# Patient Record
Sex: FEMALE | Race: WHITE | URBAN - METROPOLITAN AREA
[De-identification: names, ages, dates, MRNs, and addresses within clinical notes are randomized per-mention and may not be internally consistent; named-entity substitution may affect disease eponyms.]

---

## 2022-04-10 ENCOUNTER — HISTORICAL (OUTPATIENT)
Dept: ADMINISTRATIVE | Facility: HOSPITAL | Age: 22
End: 2022-04-10

## 2022-04-26 VITALS
SYSTOLIC BLOOD PRESSURE: 110 MMHG | WEIGHT: 211 LBS | HEIGHT: 70 IN | BODY MASS INDEX: 30.21 KG/M2 | DIASTOLIC BLOOD PRESSURE: 78 MMHG

## 2022-06-03 ENCOUNTER — TELEPHONE (OUTPATIENT)
Dept: PLASTIC SURGERY | Facility: CLINIC | Age: 22
End: 2022-06-03
Payer: COMMERCIAL

## 2022-06-03 DIAGNOSIS — F64.0 GENDER DYSPHORIA IN ADOLESCENT AND ADULT: Primary | ICD-10-CM

## 2022-06-03 NOTE — TELEPHONE ENCOUNTER
[facility] :  Care Coordination Note     SITUATION   Benito Nur (they/them) is a 21 year old adult who is receiving support for:  Care Team  .    BACKGROUND     Writer scheduled consultations with Ayesha to discuss top surgery.     ASSESSMENT     Surgery              CGC Assessment  Comprehensive Gender Care (CGC) Enrollment: Enrolled  Patient has a therapist: Yes  Letter of support #1: Requested  Surgery being considered: Yes  Mastectomy: Yes          PLAN          Nursing Interventions:      Follow-up plan:    1. Obtain JANNET Pineda

## 2022-06-15 ENCOUNTER — TRANSFERRED RECORDS (OUTPATIENT)
Dept: MULTI SPECIALTY CLINIC | Facility: CLINIC | Age: 22
End: 2022-06-15

## 2022-06-15 LAB — PAP-ABSTRACT: NORMAL

## 2022-06-24 ENCOUNTER — OFFICE VISIT (OUTPATIENT)
Dept: PLASTIC SURGERY | Facility: AMBULATORY SURGERY CENTER | Age: 22
End: 2022-06-24
Payer: COMMERCIAL

## 2022-06-24 VITALS — WEIGHT: 222 LBS | DIASTOLIC BLOOD PRESSURE: 72 MMHG | SYSTOLIC BLOOD PRESSURE: 118 MMHG

## 2022-06-24 DIAGNOSIS — Z80.3 FAMILY HISTORY OF MALIGNANT NEOPLASM OF BREAST: ICD-10-CM

## 2022-06-24 DIAGNOSIS — F64.0 GENDER DYSPHORIA IN ADOLESCENT AND ADULT: Primary | ICD-10-CM

## 2022-06-24 PROCEDURE — 99203 OFFICE O/P NEW LOW 30 MIN: CPT | Performed by: PLASTIC SURGERY

## 2022-06-24 RX ORDER — CLONIDINE HYDROCHLORIDE 0.1 MG/1
0.1 TABLET ORAL
COMMUNITY
Start: 2021-06-01

## 2022-06-24 RX ORDER — FAMOTIDINE 20 MG/1
20 TABLET, FILM COATED ORAL
COMMUNITY
Start: 2022-06-15

## 2022-06-24 RX ORDER — LAMOTRIGINE 200 MG/1
TABLET ORAL
COMMUNITY
Start: 2022-06-15

## 2022-06-24 RX ORDER — CETIRIZINE HYDROCHLORIDE 10 MG/1
10 TABLET ORAL
COMMUNITY
End: 2024-01-05

## 2022-06-24 RX ORDER — MECOBALAMIN 5000 MCG
15 TABLET,DISINTEGRATING ORAL
COMMUNITY
Start: 2022-06-15 | End: 2024-01-05

## 2022-06-24 RX ORDER — BUPROPION HYDROCHLORIDE 300 MG/1
300 TABLET ORAL
COMMUNITY
Start: 2021-06-01

## 2022-06-24 NOTE — LETTER
6/24/2022         RE: Benito Nur  [address]  [place] [address]        Dear Colleague,    Thank you for referring your patient, Benito Nur, to the [facility]. Please see a copy of my visit note below.    June 24, 2022    Chief complaint:  Gender dysphoria, consultation for top surgery     History of present illness:  This is a 21 year old year old biological female who comes today for consultation regarding top surgery.  Pronouns they/them.  Patient's preferred name is Benito.  At this time the letter of support is pending. Currently Benito is trying to cover the breasts with binders.  Benito is not receiving any testosterone treatment.     Past medical history:  No past medical history on file.    Gender dysphoria     Past surgical history:  Adenoidectomy     Allergies:  No known drug allergies    Medications:  No current outpatient medications on file.    Family history:  Mother with breast cancer     Social History:  Denies tobacco, smokes cannabis, denies alcohol.     Review of systems:  General ROS: No complaints or constitutional symptoms  Skin: No complaints or symptoms   Hematologic/Lymphatic: No symptoms or complaints  Psychiatric: Patient presents with gender dysphoria  Endocrine: No excessive fatigue, no hypermetabolic symptoms reported  Respiratory ROS: No cough, shortness of breath, or wheezing  Cardiovascular ROS: No chest pain or dyspnea on exertion  Breast ROS: Denies nipple discharge, denies palpable breast masses, denies peau d'orange.  Gastrointestinal ROS: No abdominal pain, nausea, diarrhea, or constipation  Musculoskeletal ROS: No recent injuries reported  Neurological ROS: No focal neurologic defects reported.       Physical exam:    There were no vitals taken for this visit.  General: Alert, cooperative, appears stated age   Skin: Skin color, texture, turgor normal, no rashes or lesions   Lymphatic: No obvious adenopathy, no swelling   Eyes:  "No scleral icterus, pupils equal  HENT: No traumatic injury to the head or face, no gross abnormalities  Lungs: Normal respiratory effort, breath sounds equal bilaterally  Heart: Regular rate and rhythm  Breasts:  Bilateral grade 1 ptosis, no nipple inversion or no peau d'orange.  Right breast is smaller than the left breast with obvious asymmetry.  No obvious palpable breast masses or nipple discharge.  No palpable axillary lymphadenopathies bilaterally.  Abdomen: Soft, non-distended and non-tender to palpation  Neurologic: Grossly intact                               Assessment:     21 year old years old biological female with history of gender dysphoria.    Family history of breast cancer (mother).     PLAN:      I believe that  Benito is a good candidate for gender affirming top surgery with one of the following options: Bilateral simple mastectomies versus bilateral breast reduction with possible nipple graft reconstruction.  Patient desires NO nipple reconst ruction.     \"According to [place] case law and [facility] standards of care, with an appropriate letter of support from a mental health provider, top surgery/mastectomy is medically necessary for the treatment of gender dysphoria.\"     I discussed with  Benito the risks of surgery and they include but are not limited to scarring, keloid formation, infection, bleeding, hematoma, seroma, contour deformities, lack of sensation on the chest, the fact that the will not have any nipples, need for further surgeries.     Patient did acknowledge all of these risks and has agreed to proceed.     We will obtain a letter of support from  their therapist and then we will schedule for surgery: bilateral simple mastectomies with NO bilateral nipple grafting.    Due to the fact that their mother has history of breast cancer, I will obtain preoperative mammogram and ultrasound.     Time spent with the patient 30 minutes.      Lester Wagner MD , FACS   Diplomate American " Board of Plastic Surgery  Diplomate American Board of Surgery  Adj. Assistant Professor of Surgery  Division of Plastic & Reconstructive Surgery   [facility]  Office: [phone]   6/24/2022 at 1:39 PM         Again, thank you for allowing me to participate in the care of your patient.        Sincerely,        Lester Wagner MD

## 2022-06-24 NOTE — PROGRESS NOTES
June 24, 2022    Chief complaint:  Gender dysphoria, consultation for top surgery     History of present illness:  This is a 21 year old year old biological female who comes today for consultation regarding top surgery.  Pronouns they/them.  Patient's preferred name is Benito.  At this time the letter of support is pending. Currently Benito is trying to cover the breasts with binders.  Benito is not receiving any testosterone treatment.     Past medical history:  No past medical history on file.    Gender dysphoria     Past surgical history:  Adenoidectomy     Allergies:  No known drug allergies    Medications:  No current outpatient medications on file.    Family history:  Mother with breast cancer     Social History:  Denies tobacco, smokes cannabis, denies alcohol.     Review of systems:  General ROS: No complaints or constitutional symptoms  Skin: No complaints or symptoms   Hematologic/Lymphatic: No symptoms or complaints  Psychiatric: Patient presents with gender dysphoria  Endocrine: No excessive fatigue, no hypermetabolic symptoms reported  Respiratory ROS: No cough, shortness of breath, or wheezing  Cardiovascular ROS: No chest pain or dyspnea on exertion  Breast ROS: Denies nipple discharge, denies palpable breast masses, denies peau d'orange.  Gastrointestinal ROS: No abdominal pain, nausea, diarrhea, or constipation  Musculoskeletal ROS: No recent injuries reported  Neurological ROS: No focal neurologic defects reported.       Physical exam:    There were no vitals taken for this visit.  General: Alert, cooperative, appears stated age   Skin: Skin color, texture, turgor normal, no rashes or lesions   Lymphatic: No obvious adenopathy, no swelling   Eyes: No scleral icterus, pupils equal  HENT: No traumatic injury to the head or face, no gross abnormalities  Lungs: Normal respiratory effort, breath sounds equal bilaterally  Heart: Regular rate and rhythm  Breasts:  Bilateral grade 1 ptosis, no nipple  "inversion or no peau d'orange.  Right breast is smaller than the left breast with obvious asymmetry.  No obvious palpable breast masses or nipple discharge.  No palpable axillary lymphadenopathies bilaterally.  Abdomen: Soft, non-distended and non-tender to palpation  Neurologic: Grossly intact                               Assessment:     21 year old years old biological female with history of gender dysphoria.    Family history of breast cancer (mother).     PLAN:      I believe that  Benito is a good candidate for gender affirming top surgery with one of the following options: Bilateral simple mastectomies versus bilateral breast reduction with possible nipple graft reconstruction.  Patient desires NO nipple reconst ruction.     \"According to [place] case law and [facility] standards of care, with an appropriate letter of support from a mental health provider, top surgery/mastectomy is medically necessary for the treatment of gender dysphoria.\"     I discussed with  Benito the risks of surgery and they include but are not limited to scarring, keloid formation, infection, bleeding, hematoma, seroma, contour deformities, lack of sensation on the chest, the fact that the will not have any nipples, need for further surgeries.     Patient did acknowledge all of these risks and has agreed to proceed.     We will obtain a letter of support from  their therapist and then we will schedule for surgery: bilateral simple mastectomies with NO bilateral nipple grafting.    Due to the fact that their mother has history of breast cancer, I will obtain preoperative mammogram and ultrasound.     Time spent with the patient 30 minutes.      Lester Wagner MD , FACS   Diplomate American Board of Plastic Surgery  Diplomate American Board of Surgery  Adj. Assistant Professor of Surgery  Division of Plastic & Reconstructive Surgery   [facility]  Office: [phone]   6/24/2022 at 1:39 PM     "

## 2022-07-24 ENCOUNTER — HEALTH MAINTENANCE LETTER (OUTPATIENT)
Age: 22
End: 2022-07-24

## 2022-07-29 ENCOUNTER — HOSPITAL ENCOUNTER (OUTPATIENT)
Dept: ULTRASOUND IMAGING | Facility: CLINIC | Age: 22
Discharge: HOME | End: 2022-07-29
Attending: PLASTIC SURGERY
Payer: COMMERCIAL

## 2022-07-29 DIAGNOSIS — Z80.3 FAMILY HISTORY OF MALIGNANT NEOPLASM OF BREAST: ICD-10-CM

## 2022-07-29 PROCEDURE — 76641 ULTRASOUND BREAST COMPLETE: CPT | Mod: 50

## 2022-08-18 NOTE — TELEPHONE ENCOUNTER
FUTURE VISIT INFORMATION      FUTURE VISIT INFORMATION:    Date: 11/29/22    Time: 1:00pm    [place] [facility] [place] INFORMATION:    Reason for visit/diagnosis  Top consult    RECORDS REQUESTED FROM:       No recs to collect

## 2022-08-23 ENCOUNTER — DOCUMENTATION ONLY (OUTPATIENT)
Dept: PLASTIC SURGERY | Facility: CLINIC | Age: 22
End: 2022-08-23

## 2022-08-23 NOTE — PROGRESS NOTES
[facility] :  Care Coordination Note     SITUATION   Benito Nur is a 22 year old adult who is receiving support for:  Care Team  .    BACKGROUND     Reviewed LOS. Meets criteria    ASSESSMENT     Surgery              CGC Assessment  Comprehensive Gender Care (CGC) Enrollment: Enrolled  Patient has a therapist: Yes  Name of therapist: Sylvia Horvath  Letter of support #1: Received  Letter #1 Date: 06/14/22  Surgery being considered: Yes  Mastectomy: Yes          PLAN          Nursing Interventions:       Follow-up plan:  Reviewed LOS. Meets criteria.       ASH[facility] CORNEJO [facility]

## 2022-09-02 ENCOUNTER — TELEPHONE (OUTPATIENT)
Dept: PLASTIC SURGERY | Facility: AMBULATORY SURGERY CENTER | Age: 22
End: 2022-09-02

## 2022-09-07 ENCOUNTER — TELEPHONE (OUTPATIENT)
Dept: PLASTIC SURGERY | Facility: AMBULATORY SURGERY CENTER | Age: 22
End: 2022-09-07

## 2022-09-08 ENCOUNTER — TELEPHONE (OUTPATIENT)
Dept: PLASTIC SURGERY | Facility: AMBULATORY SURGERY CENTER | Age: 22
End: 2022-09-08

## 2022-09-08 PROBLEM — F64.0 GENDER DYSPHORIA IN ADOLESCENT AND ADULT: Status: ACTIVE | Noted: 2022-09-08

## 2022-09-08 NOTE — LETTER
We've received instruction to get you scheduled for surgery with Dr Wagner. We have that arranged as follows:     Pre-op Physical:  Call your primary clinic to schedule.    Surgery Date: 10/5/2022     [facility]  [address], [address], [place]  [address]    Approximate Arrival Time: 9:30 am  (Unless instructed differently by the pre-op call nurse)     Post op Appointment: 10/13/2022 at 2:45 pm at Dr Wagner's office in [place].  [facility], [address], [place] [address].    Prep Tasks and Info:     1. Schedule a pre-op physical with your primary care doctor. This must be within 30 days of surgery and since it required by anesthesia, your surgery will be cancelled if it's not done. Call your clinic asap to get this scheduled.    2. Review your medications with your primary care or prescribing physician; they will advise you which meds to stop and when, and when you can resume taking.  Certain medications like blood thinners need to be stopped in advance of surgery to proceed safely.    3. A home rapid antigen Covid-19 test is required 1-2 days before surgery- regardless of your vaccination status.  Take a photo of the negative result and show to the nurse on the day of surgery. If you test positive, contact our office right away to reschedule surgery. You can buy a home Covid-19 Rapid Antigen test at many local pharmacies, or you can order for free at covid.gov/tests.    4. Please shower the evening before and morning of surgery with Hibiclens or Exidine soap.  This can be found at your local pharmacy.    5. Fasting instructions will be provided by the pre-op nurse who will call you 1-3 days before surgery.  Typically we advise normal food up to 8 hours before surgery then clear liquids only up to 2 hours before surgery then nothing at all by mouth for 2 hours including no gum  or candy.  The nurse will review your specific instructions with you at the call.      6. Smoking impacts your body's ability to heal properly.  Dr Wagner patients are required to be nicotine free for 6-8 weeks before surgery.      7. You will need an adult to drive you home and stay with you 24 hours after surgery. Public transportation or Medical Van Services are not permitted.    8. You may have one family member wait in the lobby at the surgery center during your surgery. Visitor restrictions are subject to change, please verify with the pre-op nurse when they call.    9. If the community sees a new COVID19 surge, your procedure may need to be postponed. We will contact you if this happens. You will be screened for high-risk exposure to Covid-19 during the pre-op call.  We encourage you to quarantine yourself away from any Covid-19 people for 10 days before surgery to avoid possible last minute cancellations.   When you arrive to the surgery center, you will again be screened for COVID19 symptoms. If you screen positive, your surgery will need to be postponed.    10. We always encourage you to notify your insurance any time you have medical tests or procedures scheduled including surgery. The number is usually right on the back of your insurance card. Please call [facility] Cost of Care at [phone] if you'd like a surgery quote.       Call our office if you have any questions! Thank you!

## 2022-09-08 NOTE — TELEPHONE ENCOUNTER
Spoke with patient today regarding surgery scheduling     Went over details/instructions.    Surgery Letter sent via [employer]

## 2022-10-04 ENCOUNTER — ANESTHESIA EVENT (OUTPATIENT)
Dept: SURGERY | Facility: AMBULATORY SURGERY CENTER | Age: 22
End: 2022-10-04
Payer: COMMERCIAL

## 2022-10-04 RX ORDER — NALOXONE HYDROCHLORIDE 0.4 MG/ML
0.2 INJECTION, SOLUTION INTRAMUSCULAR; INTRAVENOUS; SUBCUTANEOUS
Status: DISCONTINUED | OUTPATIENT
Start: 2022-10-04 | End: 2022-10-06 | Stop reason: HOSPADM

## 2022-10-04 RX ORDER — NALOXONE HYDROCHLORIDE 0.4 MG/ML
0.4 INJECTION, SOLUTION INTRAMUSCULAR; INTRAVENOUS; SUBCUTANEOUS
Status: DISCONTINUED | OUTPATIENT
Start: 2022-10-04 | End: 2022-10-06 | Stop reason: HOSPADM

## 2022-10-04 NOTE — H&P
Chief complaint:  Gender dysphoria    History of present illness:  This is a 22 year old patient with history of gender dysphoria who presents today for their elective bilateral simple mastectomy with no nipple grafting.    Past medical history:  No past medical history on file.     Gender dysphoria    Past surgical history:  Adenoidectomy    Allergies:  No known drug allergies    Medications:    Current Outpatient Medications:     buPROPion (WELLBUTRIN XL) 300 MG 24 hr tablet, Take 300 mg by mouth, Disp: , Rfl:     cetirizine (ZYRTEC) 10 MG tablet, Take 10 mg by mouth, Disp: , Rfl:     cloNIDine (CATAPRES) 0.1 MG tablet, Take 0.1 mg by mouth, Disp: , Rfl:     famotidine (PEPCID) 20 MG tablet, Take 20 mg by mouth, Disp: , Rfl:     lamoTRIgine (LAMICTAL) 200 MG tablet, Take 50 mg in the AM and 200 mg at bedtime., Disp: , Rfl:     LANsoprazole (PREVACID) 15 MG DR capsule, Take 15 mg by mouth, Disp: , Rfl:     Current Facility-Administered Medications:     naloxone (NARCAN) injection 0.2 mg, 0.2 mg, Intravenous, Q2 Min PRN **OR** naloxone (NARCAN) injection 0.4 mg, 0.4 mg, Intravenous, Q2 Min PRN **OR** naloxone (NARCAN) injection 0.2 mg, 0.2 mg, Intramuscular, Q2 Min PRN **OR** naloxone (NARCAN) injection 0.4 mg, 0.4 mg, Intramuscular, Q2 Min PRN, uLke Lindo MD    Family history:  Mother with breast cancer    Social History:  Denies tobacco, denies alcohol.  Patient does smoke cannabis.    Review of systems:  General ROS: No complaints or constitutional symptoms  Skin: No complaints or symptoms   Hematologic/Lymphatic: No symptoms or complaints  Psychiatric: No symptoms or complaints  Endocrine: No excessive fatigue, no hypermetabolic symptoms reported  Respiratory ROS: No cough, shortness of breath, or wheezing  Cardiovascular ROS: No chest pain or dyspnea on exertion  Breast ROS: Denies palpable breast masses, denies nipple discharge, denies peau d'orange  Gastrointestinal ROS: No abdominal pain, nausea,  "diarrhea, or constipation  Musculoskeletal ROS: No recent injuries reported  Neurological ROS: No focal neurologic defects reported.      Physical exam:  BP 93/60   Pulse 73   Temp (!) 96.5  F (35.8  C) (Temporal)   Resp 14   Ht 1.778 m (5' 10\")   Wt 99.8 kg (220 lb)   LMP 09/14/2022   SpO2 98%   BMI 31.57 kg/m      General: Alert, cooperative, appears stated age   Skin: Skin color, texture, turgor normal, no rashes or lesions   Lymphatic: No obvious adenopathy, no swelling   Eyes: No scleral icterus, pupils equal  HENT: No traumatic injury to the head or face, no gross abnormalities  Lungs: Normal respiratory effort, breath sounds equal bilaterally  Heart: Regular rate and rhythm  Breasts:  Bilateral grade 1 ptosis, no nipple inversion or no peau d'orange.  Right breast is smaller than the left breast with obvious asymmetry.  No obvious palpable breast masses or nipple discharge.  No palpable axillary lymphadenopathies bilaterally.  Abdomen: Soft, non-distended and non-tender to palpation  Neurologic: Grossly intact      ASSESSMENT:    This is a 22 year old patient with history of gender dysphoria.     PLAN:     Bilateral double incision mastectomy with no nipple graft.    I discussed with  Benito the risks of surgery and they include but are not limited to scarring, keloid formation, infection, bleeding, hematoma, seroma, contour deformities, lack of sensation on the chest, the fact that the will not have any nipples, need for further surgeries.    Patient has acknowledged all these risks and has agreed to proceed.    Lester Wagner MD , FACS   Diplomate American Board of Plastic Surgery  Diplomate American Board of Surgery  Adj. Assistant Professor of Surgery  Division of Plastic & Reconstructive Surgery   [facility]  Office: [phone]   10/5/2022 at 11:38 AM        "

## 2022-10-05 ENCOUNTER — ANESTHESIA (OUTPATIENT)
Dept: SURGERY | Facility: AMBULATORY SURGERY CENTER | Age: 22
End: 2022-10-05
Payer: COMMERCIAL

## 2022-10-05 ENCOUNTER — HOSPITAL ENCOUNTER (OUTPATIENT)
Facility: AMBULATORY SURGERY CENTER | Age: 22
Discharge: HOME | End: 2022-10-05
Attending: PLASTIC SURGERY | Admitting: PLASTIC SURGERY
Payer: COMMERCIAL

## 2022-10-05 VITALS
BODY MASS INDEX: 31.5 KG/M2 | RESPIRATION RATE: 14 BRPM | HEIGHT: 70 IN | WEIGHT: 220 LBS | TEMPERATURE: 97.3 F | OXYGEN SATURATION: 99 % | DIASTOLIC BLOOD PRESSURE: 65 MMHG | SYSTOLIC BLOOD PRESSURE: 110 MMHG | HEART RATE: 90 BPM

## 2022-10-05 DIAGNOSIS — F64.0 GENDER DYSPHORIA IN ADOLESCENT AND ADULT: ICD-10-CM

## 2022-10-05 DIAGNOSIS — F64.0 TRANSSEXUALISM: Primary | ICD-10-CM

## 2022-10-05 LAB
HCG UR QL: NEGATIVE
INTERNAL QC OK POCT: NORMAL
POCT KIT EXPIRATION DATE: NORMAL
POCT KIT LOT NUMBER: NORMAL

## 2022-10-05 PROCEDURE — 88305 TISSUE EXAM BY PATHOLOGIST: CPT | Mod: TC | Performed by: PLASTIC SURGERY

## 2022-10-05 PROCEDURE — 88305 TISSUE EXAM BY PATHOLOGIST: CPT | Mod: 26 | Performed by: PATHOLOGY

## 2022-10-05 PROCEDURE — 81025 URINE PREGNANCY TEST: CPT | Performed by: PATHOLOGY

## 2022-10-05 PROCEDURE — 19303 MAST SIMPLE COMPLETE: CPT | Mod: 50

## 2022-10-05 PROCEDURE — 19303 MAST SIMPLE COMPLETE: CPT | Mod: 50 | Performed by: PLASTIC SURGERY

## 2022-10-05 RX ORDER — NALOXONE HYDROCHLORIDE 0.4 MG/ML
0.4 INJECTION, SOLUTION INTRAMUSCULAR; INTRAVENOUS; SUBCUTANEOUS
Status: DISCONTINUED | OUTPATIENT
Start: 2022-10-05 | End: 2022-10-06 | Stop reason: HOSPADM

## 2022-10-05 RX ORDER — AMOXICILLIN 250 MG
1-2 CAPSULE ORAL 2 TIMES DAILY
Qty: 30 TABLET | Refills: 0 | Status: SHIPPED | OUTPATIENT
Start: 2022-10-05 | End: 2023-09-08

## 2022-10-05 RX ORDER — CEPHALEXIN 500 MG/1
500 CAPSULE ORAL 3 TIMES DAILY
Qty: 21 CAPSULE | Refills: 0 | Status: SHIPPED | OUTPATIENT
Start: 2022-10-05 | End: 2022-10-12

## 2022-10-05 RX ORDER — FENTANYL CITRATE 50 UG/ML
25 INJECTION, SOLUTION INTRAMUSCULAR; INTRAVENOUS
Status: DISCONTINUED | OUTPATIENT
Start: 2022-10-05 | End: 2022-10-06 | Stop reason: HOSPADM

## 2022-10-05 RX ORDER — NALOXONE HYDROCHLORIDE 0.4 MG/ML
0.2 INJECTION, SOLUTION INTRAMUSCULAR; INTRAVENOUS; SUBCUTANEOUS
Status: DISCONTINUED | OUTPATIENT
Start: 2022-10-05 | End: 2022-10-06 | Stop reason: HOSPADM

## 2022-10-05 RX ORDER — HYDROMORPHONE HYDROCHLORIDE 1 MG/ML
0.2 INJECTION, SOLUTION INTRAMUSCULAR; INTRAVENOUS; SUBCUTANEOUS EVERY 5 MIN PRN
Status: DISCONTINUED | OUTPATIENT
Start: 2022-10-05 | End: 2022-10-05 | Stop reason: HOSPADM

## 2022-10-05 RX ORDER — PROPOFOL 10 MG/ML
INJECTION, EMULSION INTRAVENOUS CONTINUOUS PRN
Status: DISCONTINUED | OUTPATIENT
Start: 2022-10-05 | End: 2022-10-05

## 2022-10-05 RX ORDER — LIDOCAINE HYDROCHLORIDE 20 MG/ML
INJECTION, SOLUTION INFILTRATION; PERINEURAL PRN
Status: DISCONTINUED | OUTPATIENT
Start: 2022-10-05 | End: 2022-10-05

## 2022-10-05 RX ORDER — EPHEDRINE SULFATE 50 MG/ML
INJECTION, SOLUTION INTRAMUSCULAR; INTRAVENOUS; SUBCUTANEOUS PRN
Status: DISCONTINUED | OUTPATIENT
Start: 2022-10-05 | End: 2022-10-05

## 2022-10-05 RX ORDER — LIDOCAINE 40 MG/G
CREAM TOPICAL
Status: DISCONTINUED | OUTPATIENT
Start: 2022-10-05 | End: 2022-10-05 | Stop reason: HOSPADM

## 2022-10-05 RX ORDER — DEXAMETHASONE SODIUM PHOSPHATE 4 MG/ML
INJECTION, SOLUTION INTRA-ARTICULAR; INTRALESIONAL; INTRAMUSCULAR; INTRAVENOUS; SOFT TISSUE PRN
Status: DISCONTINUED | OUTPATIENT
Start: 2022-10-05 | End: 2022-10-05

## 2022-10-05 RX ORDER — CEFAZOLIN SODIUM 2 G/50ML
2 SOLUTION INTRAVENOUS
Status: COMPLETED | OUTPATIENT
Start: 2022-10-05 | End: 2022-10-05

## 2022-10-05 RX ORDER — ACETAMINOPHEN 325 MG/1
975 TABLET ORAL ONCE
Status: COMPLETED | OUTPATIENT
Start: 2022-10-05 | End: 2022-10-05

## 2022-10-05 RX ORDER — SODIUM CHLORIDE, SODIUM LACTATE, POTASSIUM CHLORIDE, CALCIUM CHLORIDE 600; 310; 30; 20 MG/100ML; MG/100ML; MG/100ML; MG/100ML
INJECTION, SOLUTION INTRAVENOUS CONTINUOUS
Status: DISCONTINUED | OUTPATIENT
Start: 2022-10-05 | End: 2022-10-05 | Stop reason: HOSPADM

## 2022-10-05 RX ORDER — FENTANYL CITRATE 50 UG/ML
25 INJECTION, SOLUTION INTRAMUSCULAR; INTRAVENOUS EVERY 5 MIN PRN
Status: DISCONTINUED | OUTPATIENT
Start: 2022-10-05 | End: 2022-10-05 | Stop reason: HOSPADM

## 2022-10-05 RX ORDER — OXYCODONE HYDROCHLORIDE 5 MG/1
5 TABLET ORAL EVERY 4 HOURS PRN
Status: DISCONTINUED | OUTPATIENT
Start: 2022-10-05 | End: 2022-10-06 | Stop reason: HOSPADM

## 2022-10-05 RX ORDER — SODIUM CHLORIDE, SODIUM LACTATE, POTASSIUM CHLORIDE, CALCIUM CHLORIDE 600; 310; 30; 20 MG/100ML; MG/100ML; MG/100ML; MG/100ML
INJECTION, SOLUTION INTRAVENOUS CONTINUOUS
Status: DISCONTINUED | OUTPATIENT
Start: 2022-10-05 | End: 2022-10-06 | Stop reason: HOSPADM

## 2022-10-05 RX ORDER — FLUMAZENIL 0.1 MG/ML
0.2 INJECTION, SOLUTION INTRAVENOUS
Status: DISCONTINUED | OUTPATIENT
Start: 2022-10-05 | End: 2022-10-05 | Stop reason: HOSPADM

## 2022-10-05 RX ORDER — ONDANSETRON 4 MG/1
4 TABLET, ORALLY DISINTEGRATING ORAL EVERY 8 HOURS PRN
Qty: 9 TABLET | Refills: 0 | Status: SHIPPED | OUTPATIENT
Start: 2022-10-05 | End: 2023-09-08

## 2022-10-05 RX ORDER — FENTANYL CITRATE 50 UG/ML
25-50 INJECTION, SOLUTION INTRAMUSCULAR; INTRAVENOUS
Status: DISCONTINUED | OUTPATIENT
Start: 2022-10-05 | End: 2022-10-05 | Stop reason: HOSPADM

## 2022-10-05 RX ORDER — ONDANSETRON 2 MG/ML
4 INJECTION INTRAMUSCULAR; INTRAVENOUS EVERY 30 MIN PRN
Status: DISCONTINUED | OUTPATIENT
Start: 2022-10-05 | End: 2022-10-06 | Stop reason: HOSPADM

## 2022-10-05 RX ORDER — BUPIVACAINE HYDROCHLORIDE 2.5 MG/ML
INJECTION, SOLUTION EPIDURAL; INFILTRATION; INTRACAUDAL
Status: COMPLETED | OUTPATIENT
Start: 2022-10-05 | End: 2022-10-05

## 2022-10-05 RX ORDER — GLYCOPYRROLATE 0.2 MG/ML
INJECTION, SOLUTION INTRAMUSCULAR; INTRAVENOUS PRN
Status: DISCONTINUED | OUTPATIENT
Start: 2022-10-05 | End: 2022-10-05

## 2022-10-05 RX ORDER — PROPOFOL 10 MG/ML
INJECTION, EMULSION INTRAVENOUS PRN
Status: DISCONTINUED | OUTPATIENT
Start: 2022-10-05 | End: 2022-10-05

## 2022-10-05 RX ORDER — MEPERIDINE HYDROCHLORIDE 25 MG/ML
12.5 INJECTION INTRAMUSCULAR; INTRAVENOUS; SUBCUTANEOUS
Status: DISCONTINUED | OUTPATIENT
Start: 2022-10-05 | End: 2022-10-06 | Stop reason: HOSPADM

## 2022-10-05 RX ORDER — ONDANSETRON 4 MG/1
4 TABLET, ORALLY DISINTEGRATING ORAL EVERY 30 MIN PRN
Status: DISCONTINUED | OUTPATIENT
Start: 2022-10-05 | End: 2022-10-06 | Stop reason: HOSPADM

## 2022-10-05 RX ORDER — ONDANSETRON 2 MG/ML
INJECTION INTRAMUSCULAR; INTRAVENOUS PRN
Status: DISCONTINUED | OUTPATIENT
Start: 2022-10-05 | End: 2022-10-05

## 2022-10-05 RX ORDER — HYDROCODONE BITARTRATE AND ACETAMINOPHEN 5; 325 MG/1; MG/1
1-2 TABLET ORAL EVERY 4 HOURS PRN
Qty: 30 TABLET | Refills: 0 | Status: SHIPPED | OUTPATIENT
Start: 2022-10-05 | End: 2022-10-13

## 2022-10-05 RX ADMIN — FENTANYL CITRATE 50 MCG: 50 INJECTION, SOLUTION INTRAMUSCULAR; INTRAVENOUS at 12:57

## 2022-10-05 RX ADMIN — DEXAMETHASONE SODIUM PHOSPHATE 4 MG: 4 INJECTION, SOLUTION INTRA-ARTICULAR; INTRALESIONAL; INTRAMUSCULAR; INTRAVENOUS; SOFT TISSUE at 12:53

## 2022-10-05 RX ADMIN — EPHEDRINE SULFATE 10 MG: 50 INJECTION, SOLUTION INTRAMUSCULAR; INTRAVENOUS; SUBCUTANEOUS at 12:55

## 2022-10-05 RX ADMIN — SODIUM CHLORIDE, SODIUM LACTATE, POTASSIUM CHLORIDE, CALCIUM CHLORIDE: 600; 310; 30; 20 INJECTION, SOLUTION INTRAVENOUS at 10:40

## 2022-10-05 RX ADMIN — FENTANYL CITRATE 50 MCG: 50 INJECTION, SOLUTION INTRAMUSCULAR; INTRAVENOUS at 12:45

## 2022-10-05 RX ADMIN — PROPOFOL 200 MG: 10 INJECTION, EMULSION INTRAVENOUS at 12:49

## 2022-10-05 RX ADMIN — Medication 0.5 MG: at 13:34

## 2022-10-05 RX ADMIN — ONDANSETRON 4 MG: 2 INJECTION INTRAMUSCULAR; INTRAVENOUS at 15:18

## 2022-10-05 RX ADMIN — PROPOFOL 180 MCG/KG/MIN: 10 INJECTION, EMULSION INTRAVENOUS at 12:51

## 2022-10-05 RX ADMIN — GLYCOPYRROLATE 0.2 MG: 0.2 INJECTION, SOLUTION INTRAMUSCULAR; INTRAVENOUS at 12:54

## 2022-10-05 RX ADMIN — BUPIVACAINE HYDROCHLORIDE 20 ML: 2.5 INJECTION, SOLUTION EPIDURAL; INFILTRATION; INTRACAUDAL at 10:51

## 2022-10-05 RX ADMIN — LIDOCAINE HYDROCHLORIDE 100 MG: 20 INJECTION, SOLUTION INFILTRATION; PERINEURAL at 12:49

## 2022-10-05 RX ADMIN — OXYCODONE HYDROCHLORIDE 5 MG: 5 TABLET ORAL at 16:05

## 2022-10-05 RX ADMIN — SODIUM CHLORIDE, SODIUM LACTATE, POTASSIUM CHLORIDE, CALCIUM CHLORIDE: 600; 310; 30; 20 INJECTION, SOLUTION INTRAVENOUS at 14:56

## 2022-10-05 RX ADMIN — EPHEDRINE SULFATE 10 MG: 50 INJECTION, SOLUTION INTRAMUSCULAR; INTRAVENOUS; SUBCUTANEOUS at 13:01

## 2022-10-05 RX ADMIN — Medication 0.5 MG: at 14:01

## 2022-10-05 RX ADMIN — FENTANYL CITRATE 50 MCG: 50 INJECTION, SOLUTION INTRAMUSCULAR; INTRAVENOUS at 10:48

## 2022-10-05 RX ADMIN — CEFAZOLIN SODIUM 2 G: 2 SOLUTION INTRAVENOUS at 12:37

## 2022-10-05 RX ADMIN — ACETAMINOPHEN 975 MG: 325 TABLET ORAL at 10:35

## 2022-10-05 RX ADMIN — PROPOFOL 130 MCG/KG/MIN: 10 INJECTION, EMULSION INTRAVENOUS at 14:45

## 2022-10-05 NOTE — OR NURSING
Pt had bilateral pec blocks done at bedside with experal. Tolerated well with sedation. Vitals WNL, cont to monitor.    Pt to continue oral medication as prescribed.

## 2022-10-05 NOTE — DISCHARGE INSTRUCTIONS
"[facility]  Home Care Following Anesthesia  For 24 hours after surgery:  Get plenty of rest.  A responsible adult must stay with you for at least 24 hours after you leave the surgery center.  Do not drive or use heavy equipment.  If you have weakness or tingling, don't drive or use heavy equipment until this feeling goes away.   Do not drink alcohol.   Avoid strenuous or risky activities.  Ask for help when climbing stairs.  You may feel lightheaded.  IF so, sit for a few minutes before standing.  Have someone help you get up.   If you have nausea (feel sick to your stomach): Drink only clear liquids such as apple juice, ginger ale, broth or 7-Up.  Rest may also help.  Be sure to drink enough fluids.  Move to a regular diet as you feel able.   You may have a slight fever.  Call the doctor if your fever is over 100 F (37.7 C) (taken under the tongue) or lasts longer than 24 hours.  You may have a dry mouth, a sore throat, muscle aches or trouble sleeping. These should go away after 24 hours.  Do not make important or legal decisions.   It is recommended to avoid smoking.        Today you received an Exparel block to numb the nerves near your surgery site.  This is a block using local anesthetic or \"numbing\" medication injected around the nerves to anesthetize or \"numb\" the area supplied by those nerves.  This block is injected into the muscle layer near your surgical site.  This medication may numb the location where you had surgery up to 72 hours.  If your surgical site is an arm or leg you should be careful with your affected limb, since it is possible to injure your limb without being aware of it due to the numbing.  Until full feeling returns, you should guard against bumping or hitting your limb, and avoid extreme hot or cold temperatures on the skin.  As the block wears off, the feeling will return as a tingling or prickly sensation near your surgical site.  You will " experince more discomfort from your incision as the feeling returns.  You may want to take a pain pill (a narcotic or Tylenol if this was prescribed by your surgeon) when you start to experience mild pain before the pain beomes more severe.  If your pain medications do not control your pain, you should notify your surgeon.    Tips for taking pain medications  To get the best pain relief possible, remember these points:  Take pain medications as directed, before pain becomes severe.  Pain medication can upset your stomach: taking it with food may help.  Constipation is a common side effect of pain medication. Drink plenty of  fluids.  Eat foods high in fiber. Take a stool softener if recommended by your doctor or pharmacist.  Do not drink alcohol, drive or operate machinery while taking pain medications.  Ask about other ways to control pain, such as with heat, ice or relaxation.    Tylenol/Acetaminophen Consumption  To help encourage the safe use of acetaminophen, the makers of TYLENOL  have lowered the maximum daily dose for single-ingredient Extra Strength TYLENOL  (acetaminophen) products sold in the [place] from 8 pills per day (4,000 mg) to 6 pills per day (3,000 mg). The dosing interval has also changed from 2 pills every 4-6 hours to 2 pills every 6 hours.  If you feel your pain relief is insufficient, you may take Tylenol/Acetaminophen in addition to your narcotic pain medication.   Be careful not to exceed 3,000 mg of Tylenol/Acetaminophen in a 24 hour period from all sources.  If you are taking extra strength Tylenol/acetaminophen (500 mg), the maximum dose is 6 tablets in 24 hours.  If you are taking regular strength acetaminophen (325 mg), the maximum dose is 9 tablets in 24 hours.    Call a doctor for any of the following:  Signs of infection (fever, growing tenderness at the surgery site, a large amount of drainage or bleeding, severe pain, foul-smelling drainage, redness, swelling).  It has been over 8  to 10 hours since surgery and you are still not able to urinate (pass water).  Headache for over 24 hours.  Numbness, tingling or weakness the day after surgery (if you had spinal anesthesia).  Signs of Covid-19 infection (temperature over 100 degrees, shortness of breath, cough, loss of taste/smell, generalized body aches, persistent headache, chills, sore throat, nausea/vomiting/diarrhea)  Your doctor is:       Dr. Lester Wagner, Plastic Surgery: [phone]               Or dial [phone] and ask for the resident on call for:  Plastics  For emergency care, call the:  [facility]:  [phone] (TTY for hearing impaired: [phone])    Caring for Your Dwain-Keyes Drain    You have been discharged with a Dwain-Keyes drainage tube. This tube drains fluid from your incision, helping prevent swelling and reducing the risk for infection. The tube is held in place by a few stitches. The drain will be removed when your doctor determines you no longer need it and when the amount of drainage decreases. The color and amount of fluid varies. Right after surgery, the fluid may be bright red and may become clearer over time.   Dressing:  Keep the skin around the tube dry.  If you have a dressing, change it every day.   Wash your hands.  Remove the old bandage. Do not use scissors-you may accidentally cut the tube.  Check for any redness, swelling, drainage, or broken stitches. (Call your doctor with any of these findings).  Wash your hands again.  Wet a cotton swab (Q-tip) and clean around the incision and the tube site. Use normal saline solution (salt and water) or soap and water. Start at the tube site and move outward in a circular motion.   Pat dry.  Put a new bandage on the incision and tube site. Make the bandage large enough to cover the whole incision area.   Tape the bandage in place.  Throw out old materials and wash your hands.   Home Care:  Tape the tube to the skin below the bandage.  Make sure to keep some slack in the tube to keep it from pulling out.   DO NOT sleep on the same side as the tube.  Secure the tube and bulb inside your clothing with a safety pin. This helps keep the tube from being pulled out.   Keep the bulb compressed at all times, except when you empty it.  Empty your drain at least twice a day. Empty it more often if the drain is full.   Lift the opening of the drain.  Drain the fluid into a measuring cup.  Record the amount of fluid each time you empty. Share the information with your doctor at your follow-up visit.   Squeeze the bulb with your hands until you hear air coming out of the bulb.  Close the opening.   Tape plastic wrap over the bandage and tube site when you shower.    Stripping  the tube helps keep blood clots from blocking the tube.                         ONLY DO THIS IF YOUR DOCTOR INSTRUCTED YOU TO DO SO!  Hold the tubing where it leaves the skin with one hand. This keeps it from pulling on the skin.  Pinch the tubing with the thumb and first finger of your other hand.   Slowly and firmly pull your thumb and first finger down the tube (squeezing the tube between your fingers). Keep squeezing the tube as you run your fingers towards the bulb. If the pulling hurts or feels like it is coming out of the skin, STOP. Begin again more gently.  Let go of the tubing with both hands. If the tube is still blocked, repeat these steps three or four times. Make sure that the bulb is compressed so it creates suction.  When to call your doctor:  New or increased pain around the tube  Redness, warmth, or swelling around the incision or tube  Drainage that is foul smelling  Vomiting  Fever over 101 F degrees  Fluid leaking around the tube  Incision seems not to be healing  Stitches become loose  The tube falls out  Drainage that changes from light pick to dark red  A sudden increase or decrease in the amount of drainage (over 30 ml).  Your drainage record:  Date Time Bulb 1:  Amount of drainage (ml or cc) Bulb 2: Amount of drainage (ml or cc) Notes

## 2022-10-05 NOTE — ANESTHESIA POSTPROCEDURE EVALUATION
Patient: Benito Nur    Procedure: Procedure(s):  BILATERAL MASTECTOMY, SIMPLE WITH NO NIPPLE GRAFTING       Anesthesia Type:  General    Note:  Disposition: Outpatient   Postop Pain Control: Uneventful            Sign Out: Well controlled pain   PONV: No   Neuro/Psych: Uneventful            Sign Out: Acceptable/Baseline neuro status   Airway/Respiratory: Uneventful            Sign Out: Acceptable/Baseline resp. status   CV/Hemodynamics: Uneventful            Sign Out: Acceptable CV status   Other NRE: NONE   DID A NON-ROUTINE EVENT OCCUR? No           Last vitals:  Vitals Value Taken Time   /69 10/05/22 1600   Temp 36.2  C (97.2  F) 10/05/22 1544   Pulse 85 10/05/22 1600   Resp 14 10/05/22 1600   SpO2 99 % 10/05/22 1600       Electronically Signed By: Hesham Gillette MD  October 5, 2022  5:26 PM

## 2022-10-05 NOTE — ANESTHESIA PREPROCEDURE EVALUATION
Anesthesia Pre-Procedure Evaluation    Patient: Benito Nur   MRN: [id number] : 2000        Procedure : Procedure(s):  BILATERAL MASTECTOMY, SIMPLE WITH NO NIPPLE GRAFTING          No past medical history on file.   Past Surgical History:   Procedure Laterality Date     wisdom shiv        No Known Allergies   Social History     Tobacco Use     Smoking status: Never Smoker     Smokeless tobacco: Never Used   Substance Use Topics     Alcohol use: Not Currently     Comment: Rare      Wt Readings from Last 1 Encounters:   10/05/22 99.8 kg (220 lb)        Anesthesia Evaluation            ROS/MED HX  ENT/Pulmonary:  - neg pulmonary ROS     Neurologic:  - neg neurologic ROS     Cardiovascular:  - neg cardiovascular ROS     METS/Exercise Tolerance:     Hematologic:  - neg hematologic  ROS     Musculoskeletal:  - neg musculoskeletal ROS     GI/Hepatic:  - neg GI/hepatic ROS     Renal/Genitourinary:  - neg Renal ROS     Endo:  - neg endo ROS     Psychiatric/Substance Use:  - neg psychiatric ROS     Infectious Disease:  - neg infectious disease ROS     Malignancy:  - neg malignancy ROS     Other:  - neg other ROS          Physical Exam    Airway  airway exam normal           Respiratory Devices and Support         Dental  no notable dental history         Cardiovascular   cardiovascular exam normal          Pulmonary   pulmonary exam normal                OUTSIDE LABS:  CBC: No results found for: WBC, HGB, HCT, PLT  BMP: No results found for: NA, POTASSIUM, CHLORIDE, CO2, BUN, CR, GLC  COAGS: No results found for: PTT, INR, FIBR  POC:   Lab Results   Component Value Date    HCG Negative 10/05/2022     HEPATIC: No results found for: ALBUMIN, PROTTOTAL, ALT, AST, GGT, ALKPHOS, BILITOTAL, BILIDIRECT, VIK  OTHER: No results found for: PH, LACT, A1C, JONNY, PHOS, MAG, LIPASE, AMYLASE, TSH, T4, T3, CRP, SED    Anesthesia Plan    ASA Status:  1   NPO Status:  NPO Appropriate    Anesthesia Type: General.     - Airway: LMA    Induction: Intravenous.   Maintenance: Balanced.        Consents    Anesthesia Plan(s) and associated risks, benefits, and realistic alternatives discussed. Questions answered and patient/representative(s) expressed understanding.    - Discussed:     - Discussed with:  Patient      - Extended Intubation/Ventilatory Support Discussed: No.      - Patient is DNR/DNI Status: No    Use of blood products discussed: No .     Postoperative Care    Pain management: Peripheral nerve block (Single Shot), IV analgesics, Multi-modal analgesia.   PONV prophylaxis: Ondansetron (or other 5HT-3), Dexamethasone or Solumedrol     Comments:           H&P reviewed: Unable to attach H&P to encounter due to EHR limitations. H&P Update: appropriate H&P reviewed, patient examined. No interval changes since H&P (within 30 days).         Luke Lindo MD

## 2022-10-05 NOTE — ANESTHESIA PROCEDURE NOTES
Pectoralis Procedure Note    Pre-Procedure   Staff -        Anesthesiologist:  Luke Lindo MD       Resident/Fellow: Herbert Fletcher DO       Performed By: fellow       Location: pre-op       Procedure Start/Stop Times: 10/5/2022 10:51 AM and 10/5/2022 10:58 AM       Pre-Anesthestic Checklist: patient identified, IV checked, site marked, risks and benefits discussed, informed consent, monitors and equipment checked, pre-op evaluation, at physician/surgeon's request and post-op pain management  Timeout:       Correct Patient: Yes        Correct Procedure: Yes        Correct Site: Yes        Correct Position: Yes        Correct Laterality: Yes        Site Marked: Yes  Procedure Documentation  Procedure: Pectoralis       Diagnosis: POST OP PAIN       Laterality: bilateral       Patient Position: sitting       Patient Prep/Sterile Barriers: sterile gloves, mask       Skin prep: Chloraprep             Pectoralis I and Pectoralis II       Needle Type: short bevel       Needle Gauge: 21.        Needle Length (millimeters): 100        Ultrasound guided       1. Ultrasound was used to identify targeted nerve, plexus, vascular marker, or fascial plane and place a needle adjacent to it in real-time.       2. Ultrasound was used to visualize the spread of anesthetic in close proximity to the above referenced structure.       3. A permanent image is entered into the patient's record.    Assessment/Narrative         The placement was negative for: blood aspirated, painful injection and site bleeding       Paresthesias: No.       Bolus given via needle..        Secured via.        Insertion/Infusion Method: Single Shot       Complications: none    Medication(s) Administered   Bupivacaine 0.25% PF (Infiltration) - Infiltration   20 mL - 10/5/2022 10:51:00 AM  Bupivacaine liposome (Exparel) 1.3% LA inj susp (Infiltration) - Infiltration   20 mL - 10/5/2022 10:51:00 AM  Medication Administration Time: 10/5/2022 10:51 AM      Comments:  Bilateral pecs block with 266 mg liposomal bupivacaine

## 2022-10-05 NOTE — ANESTHESIA CARE TRANSFER NOTE
Patient: Benito Nur    Procedure: Procedure(s):  BILATERAL MASTECTOMY, SIMPLE WITH NO NIPPLE GRAFTING       Diagnosis: Gender dysphoria in adolescent and adult [F64.0]  Diagnosis Additional Information: No value filed.    Anesthesia Type:   General     Note:    Oropharynx: oropharynx clear of all foreign objects and spontaneously breathing  Level of Consciousness: awake and drowsy  Oxygen Supplementation: face mask    Independent Airway: airway patency satisfactory and stable  Dentition: dentition unchanged  Vital Signs Stable: post-procedure vital signs reviewed and stable  Report to RN Given: handoff report given  Patient transferred to: PACU    Handoff Report: Identifed the Patient, Identified the Reponsible Provider, Reviewed the pertinent medical history, Discussed the surgical course, Reviewed Intra-OP anesthesia mangement and issues during anesthesia, Set expectations for post-procedure period and Allowed opportunity for questions and acknowledgement of understanding      Vitals:  Vitals Value Taken Time   /58    Temp 97.2    Pulse 66    Resp 14    SpO2 100        Electronically Signed By: GAVI Pino CRNA  October 5, 2022  3:47 PM

## 2022-10-06 NOTE — OP NOTE
October 5, 2022    Benito Nur    Preoperative diagnosis: gender dysphoria.     Postoperative diagnosis: same.     Procedure: Bilateral simple mastectomy with NO nipple grafting.      Surgeon: Lester Wagner MD (no plastic surgery resident available).     Assistant: Merline Delaney CSA (certified surgical assistant).     Anesthesia: General.     Estimated blood loss:   10 mL.     Intravenous fluid:   1000 mL.     Tumescent solution:  450 mL per breast for a total of 900 mL (from 1,000 mL of Lactated Ringer and 1 mg of Epinephrine).      Findings: macroscopically normal appearing breasts     Specimens: right breast  934 grams, left breast 1085 grams.     Drains: two # 15 FR Nito drains. One per side.     Complications: none.     Disposition: Patient tolerated procedure well and was extubated and transferred to recovery room awake and in stable condition.     Indication:     This is a 22 year old biological female with diagnosis of gender dysphoria.  The patient met WPATH and insurance criteria for gender affirming top surgery.  Patient did NOT wish to preserve nipple areolar complexes, hence, nipple grafting was NOT offered.  Risks were explained to the patient and they include but are not limited to scarring, infection, keloid formation, hematoma, seroma, contour irregularities, guarantee permanent anesthesia at the level of bilateral mastectomy areas.  Patient acknowledged all these risks and agreed to proceed.     Description of procedure:     The patient was seen in the preoperative holding area and preoperative markings were drawn on the chest.  First, the midline was drawn, followed by drawing of the impression of the inferior edge of bilateral pectoralis major muscles on the chest's skin.  I also marked the lateral border of the pectoralis major muscle, from the anterior axillary line to the 9 o'clock position on the nipple areolar complex. I also marked the inframammary fold (IMF) bilaterally.      This is how the markings looked like on the patient:                   Preoperative IV antibiotics were given to the patient and the patient was then brought to the operating room and was placed supine on the operating room table.  General endotracheal anesthesia was then obtained. The patient already had sequential compression devices on the lower extremities. Thighs and forelegs were supported with pillows and secured with padded safety straps.  The arms were secured to arm boards at 90 degree angles from the table using Ace wraps.  Lower norman hugger was in place.  The patient was then put into a sitting position and adjustments were made for necessary symmetry.     Then the chest and breast area was prepped and draped in a sterile surgical fashion using ChloraPrep.       After timeout, bilateral stab incisions were made with scalpel # 15 on the outer lower quadrant of each breast and tumescent solution with a Klein's infiltrating canula was infiltrated in the sub glandular/supra-aponeurotic space. This will later facilitate dissection and hemostasis.    Then, we make incision on the skin markings for the inferior edge of the pectoralis major muscle.  Dissection with electrocautery was carried down to the capsule of bilateral breasts.  Essentially, the plane for dissection was between the subcutaneous fat and the breast capsule.  This dissection continued cranially towards the clavicles until the superior border of the breast parenchyma was visualized.  With this dissection, we were able to develop the superior mastectomy flap bilaterally.    We also dissected the space from the superior border of the breast parenchyma until the inferior edge of the clavicle.  This will later facilitate tension-free closure of the superior mastectomy flap with the lower mastectomy flap.     The breast mound was elevated from the pectoralis major muscle fascia and undermined inferiorly towards the IMF, medially towards  parasternal border but staying 2 cm laterally from the midline, and finally laterally, past the lateral border of the pectoralis major muscle, incorporating the axillary tail bilaterally. Inferiorly, the IMF was obliterated and we dissected at least 2 cm below the IMF to ensure its obliteration and a more masculine look on the patient.     At this time, we pulled inferiorly both upper mastectomy flaps and marked where they overlapped on the skin surface of the inferior pole of each breast.  This marking was approximately 3 cm cranially from the IMF.    We then proceeded to make incision on this second marking and continue dissection of the breast parenchyma between the subcutaneous tissue and the breast capsule until we reach the inframammary fold bilaterally.  This dissection developed the inferior mastectomy flap.  At this time, the breast mound was completely excised from the underlying pectoralis major muscle fascia as a mastectomy and sent to pathology as a specimens.  The right breast weight 934 grams and the left breast weight 1085 grams.      After this first resection, our incisions were temporarily closed with skin staples.  The patient was put into a sitting position.  This allowed us to make further adjustment with regard to the level of our incisions as well as the contour and shape of the chest wall.  We then resected all the markings and the extra tissue that wound gain us better symmetry.  When we were satisfied with the contour, we then irrigated the mastectomy pockets and achieved excellent hemostasis with electrocautery.     A #15 Nito drains were then introduced through separate stab incisions laterally and secured with 2-0 silk suture. The mastectomy wounds were then closed in layers with 2-0 Vicryl deep dermal buried interrupted stitches, followed by In-sorb absorbable staples, and 4-0 Stratafix running subcuticular stitches.       Exofin Dermabond was used to seal the incisions.  The Nito  drains were trimmed and put to bulb suction. Dressings of Kerlix rolls were unfurled across the anterior chest for padding before wrapping the chest circumferentially with a double long 8 inch Ace wrap for compression.  Instrument count was reported by nursing personnel as correct, patient tolerated procedure well and was extubated and transferred to recovery room awake in stable condition.      Lester Wagner MD , FACS   Diplomate American Board of Plastic Surgery  Diplomate American Board of Surgery  Adj. Assistant Professor of Surgery  Division of Plastic & Reconstructive Surgery   [facility]  Office: [phone]   10/5/2022 at 10:04 PM

## 2022-10-13 ENCOUNTER — OFFICE VISIT (OUTPATIENT)
Dept: PLASTIC SURGERY | Facility: AMBULATORY SURGERY CENTER | Age: 22
End: 2022-10-13
Payer: COMMERCIAL

## 2022-10-13 DIAGNOSIS — Z90.13 STATUS POST BILATERAL MASTECTOMY: Primary | ICD-10-CM

## 2022-10-13 PROCEDURE — 99024 POSTOP FOLLOW-UP VISIT: CPT | Performed by: PLASTIC SURGERY

## 2022-10-13 NOTE — LETTER
10/13/2022         RE: Benito Nur  [address] [place] [address] [place]  [address]  [facility] [address]        Dear Colleague,    Thank you for referring your patient, Benito Nur, to the [facility]. Please see a copy of my visit note below.    Benito is a 22 years old patient status post bilateral double incision mastectomy with no nipple grafting.  Patient is 8 days out from surgery and they are doing excellent.    At the physical exam, bilateral Nito drains have been removed, there is no evidence of hematoma or seroma.  Mastectomy incision is healing very well, nice symmetry.  No sign of infection or wound dehiscence.    Plan: May begin taking showers, apply cocoa butter lotion over the incisions, compression garment for the next 6 weeks and return to see me in 3 weeks.  Patient is happy with result.    Lester Wagner MD , FACS   Diplomate American Board of Plastic Surgery  Diplomate American Board of Surgery  Adj. Assistant Professor of Surgery  Division of Plastic & Reconstructive Surgery   [facility]  Office: [phone]   10/13/2022 at 3:21 PM         Again, thank you for allowing me to participate in the care of your patient.        Sincerely,        Lester Wagner MD

## 2022-10-13 NOTE — PROGRESS NOTES
Benito is a 22 years old patient status post bilateral double incision mastectomy with no nipple grafting.  Patient is 8 days out from surgery and they are doing excellent.    At the physical exam, bilateral Nito drains have been removed, there is no evidence of hematoma or seroma.  Mastectomy incision is healing very well, nice symmetry.  No sign of infection or wound dehiscence.    Plan: May begin taking showers, apply cocoa butter lotion over the incisions, compression garment for the next 6 weeks and return to see me in 3 weeks.  Patient is happy with result.    Lester Wagner MD , FACS   Diplomate American Board of Plastic Surgery  Diplomate American Board of Surgery  Adj. Assistant Professor of Surgery  Division of Plastic & Reconstructive Surgery   [facility]  Office: [phone]   10/13/2022 at 3:21 PM

## 2022-10-22 LAB
PATH REPORT.COMMENTS IMP SPEC: NORMAL
PATH REPORT.COMMENTS IMP SPEC: NORMAL
PATH REPORT.FINAL DX SPEC: NORMAL
PATH REPORT.GROSS SPEC: NORMAL
PATH REPORT.MICROSCOPIC SPEC OTHER STN: NORMAL
PATH REPORT.RELEVANT HX SPEC: NORMAL
PHOTO IMAGE: NORMAL

## 2022-11-03 ENCOUNTER — OFFICE VISIT (OUTPATIENT)
Dept: PLASTIC SURGERY | Facility: AMBULATORY SURGERY CENTER | Age: 22
End: 2022-11-03
Payer: COMMERCIAL

## 2022-11-03 DIAGNOSIS — Z90.13 STATUS POST BILATERAL MASTECTOMY: Primary | ICD-10-CM

## 2022-11-03 PROCEDURE — 99024 POSTOP FOLLOW-UP VISIT: CPT | Performed by: PLASTIC SURGERY

## 2022-11-03 NOTE — LETTER
11/3/2022         RE: Benito Nur  [address] [place] [address]  [address]  [facility] [address]        Dear Colleague,    Thank you for referring your patient, Benito Nur, to the [facility] [place]. Please see a copy of my visit note below.    Benito is a 22 years old patient status post bilateral double incision mastectomies with no nipple grafting.  Patient is 4 weeks out from surgery.  They are doing excellent.    At the physical exam, mastectomy incision is healing well, no evidence of hypertrophic scarring or keloid.  Nice symmetry bilaterally.                Plan: Continue with compression garment for the next 6 weeks, may return to normal activities in 2 weeks, continue with scar massage and moisturizing cream.  Follow-up with me as needed.  Patient is happy with result and so am I.    Lester Wagner MD , FACS   Diplomate American Board of Plastic Surgery  Diplomate American Board of Surgery  Adj. Assistant Professor of Surgery  Division of Plastic & Reconstructive Surgery   [facility]  Office: [phone]   11/3/2022 at 4:11 PM           Again, thank you for allowing me to participate in the care of your patient.        Sincerely,        Lester Wagner MD

## 2022-11-03 NOTE — PROGRESS NOTES
Benito is a 22 years old patient status post bilateral double incision mastectomies with no nipple grafting.  Patient is 4 weeks out from surgery.  They are doing excellent.    At the physical exam, mastectomy incision is healing well, no evidence of hypertrophic scarring or keloid.  Nice symmetry bilaterally.                Plan: Continue with compression garment for the next 6 weeks, may return to normal activities in 2 weeks, continue with scar massage and moisturizing cream.  Follow-up with me as needed.  Patient is happy with result and so am I.    Lester Wagner MD , FACS   Diplomate American Board of Plastic Surgery  Diplomate American Board of Surgery  Adj. Assistant Professor of Surgery  Division of Plastic & Reconstructive Surgery   [facility]  Office: [phone]   11/3/2022 at 4:11 PM

## 2022-11-29 ENCOUNTER — PRE VISIT (OUTPATIENT)
Dept: PLASTIC SURGERY | Facility: CLINIC | Age: 22
End: 2022-11-29

## 2023-08-13 ENCOUNTER — HEALTH MAINTENANCE LETTER (OUTPATIENT)
Age: 23
End: 2023-08-13

## 2023-08-15 ENCOUNTER — TELEPHONE (OUTPATIENT)
Dept: PLASTIC SURGERY | Facility: CLINIC | Age: 23
End: 2023-08-15
Payer: COMMERCIAL

## 2023-08-15 NOTE — TELEPHONE ENCOUNTER
Benito called asking about HRT. Writer explained they could talk with their primary care doctor about hormone therapy. They also asked about a hysterectomy. Gave them the number to the women's clinic.

## 2023-09-08 ENCOUNTER — OFFICE VISIT (OUTPATIENT)
Dept: FAMILY MEDICINE | Facility: CLINIC | Age: 23
End: 2023-09-08
Payer: COMMERCIAL

## 2023-09-08 VITALS
WEIGHT: 229 LBS | OXYGEN SATURATION: 97 % | RESPIRATION RATE: 16 BRPM | HEART RATE: 66 BPM | HEIGHT: 70 IN | DIASTOLIC BLOOD PRESSURE: 66 MMHG | SYSTOLIC BLOOD PRESSURE: 100 MMHG | BODY MASS INDEX: 32.78 KG/M2 | TEMPERATURE: 98.4 F

## 2023-09-08 DIAGNOSIS — F64.0 TRANSSEXUALISM: Primary | ICD-10-CM

## 2023-09-08 DIAGNOSIS — M54.41 CHRONIC RIGHT-SIDED LOW BACK PAIN WITH RIGHT-SIDED SCIATICA: ICD-10-CM

## 2023-09-08 DIAGNOSIS — F43.10 PTSD (POST-TRAUMATIC STRESS DISORDER): ICD-10-CM

## 2023-09-08 DIAGNOSIS — G89.29 CHRONIC RIGHT-SIDED LOW BACK PAIN WITH RIGHT-SIDED SCIATICA: ICD-10-CM

## 2023-09-08 PROBLEM — F32.A DEPRESSION: Status: ACTIVE | Noted: 2023-09-08

## 2023-09-08 PROBLEM — M54.50 CHRONIC LOW BACK PAIN: Status: ACTIVE | Noted: 2023-09-08

## 2023-09-08 PROCEDURE — 99203 OFFICE O/P NEW LOW 30 MIN: CPT | Mod: GC | Performed by: STUDENT IN AN ORGANIZED HEALTH CARE EDUCATION/TRAINING PROGRAM

## 2023-09-08 RX ORDER — LAMOTRIGINE 100 MG/1
TABLET ORAL
COMMUNITY
Start: 2023-08-13

## 2023-09-08 ASSESSMENT — PAIN SCALES - GENERAL: PAINLEVEL: MODERATE PAIN (4)

## 2023-09-08 NOTE — PATIENT INSTRUCTIONS
Mira Paula, she/they  Intake and Referral Coordinator  [facility]   [phone]    Informed Consent Form for Masculinizing Medication (Testosterone)      This form refers to the use of testosterone by persons who wish to masculinize their body. While there are risks associated with taking testosterone, when appropriately prescribed it can greatly improve mental health and quality of life. Please seek another opinion if you want additional perspective on any aspect of your care.    Masculinizing Effects    I understand that testosterone may be prescribed to masculinize my body.    2.   I understand that the masculinizing effects of testosterone can take several months to a year to become noticeable, that the rate and degree of change can t be predicted and is different for every person, and that changes may not be complete for 2-5 years after I start testosterone.    3.   I understand that these changes will likely be permanent even if I stop taking testosterone:  Lower voice pitch (i.e., voice becoming deeper).  Increased growth of hair, with thicker/coarser hairs, on arms, legs, chest, back, and abdomen.  Gradual growth of moustache/facial hair.  Hair loss at the temples and crown of the head, with the possibility of becoming completely bald.  Genital changes may or may not be permanent if testosterone is stopped. These include clitoral growth (typically 1-3 cm) and vaginal dryness.    4.   I understand that the following changes are usually not permanent (that is, they will likely reverse if I stop taking testosterone).  Acne, which may be severe and can cause permanent scarring if not treated.   Fat may redistribute to a more masculine pattern (decreased on buttocks/hips/thighs, increased in abdomen - changing from  pear shape  to  apple shape ).  Increased muscle mass and upper body strength.  Increased libido (sex drive).  Menstrual periods typically stop within 3-6 months of starting  testosterone.    5.   I understand that it is not known what the effects of testosterone are on fertility.   Fertility is reduced and I may never be able to get pregnant, even if I stop testosterone.   On the other hand, even if my period stops, it may still be possible for me to get pregnant, and I am aware of birth control options (if applicable).   I have been informed that I CANNOT take testosterone if I am pregnant.   I should consider egg banking if I desire biological children.     6. I understand that there are some aspects of my body that will not be changed by testosterone:  Breasts may appear slightly smaller due to fat loss, but will not substantially shrink.  Although voice pitch will likely drop, other aspects of speech will not become more masculine.      Risks of Testosterone    I understand that the medical effects and safety of testosterone are not fully understood, and that there may be long-term risks that are not yet known.    2.   I understand that I am strongly advised not to take more testosterone than I am prescribed, as this increases health risks. I have been informed that taking more will not make masculinization happen more quickly or increase the degree of change.    3.   I understand that testosterone can cause changes that increase my risk of heart disease, including:  decreasing good cholesterol (HDL) and increasing bad cholesterol (LDL)  increasing blood pressure  increasing deposits of fat around my internal organs    4.   I have been advised that my risks of heart disease are greater if people in my family have had heart disease, if I am overweight, or if I smoke.    5.   I have been advised that heart health checkups, including monitoring of my weight and cholesterol levels, should be done periodically as long as I am taking testosterone.    6.   I understand that testosterone can damage the liver, possibly leading to liver disease. I have been advised that I should be monitored  for as long as I am taking testosterone.    7.   I understand that testosterone can increase the amount of red blood cells and hemoglobin in my blood. While the increase is usually only to a normal male range (which does not pose health risks), a high increase can cause potentially life-threatening problems such as stroke and heart attack. I have been advised that my blood should be monitored periodically while I am taking testosterone.    8.   I understand that taking testosterone can increase my risk for diabetes by decreasing my body s response to insulin, causing weight gain, and increasing deposits of fat around my internal organs. I have been advised that my fasting blood glucose should be monitored periodically while I am taking testosterone.    9.   I understand that it is not known if testosterone increases the risk of ovarian, breast, or uterine cancer.    10. I understand that taking testosterone can lead to my cervix and the walls of my vagina becoming more fragile, and that this can lead to tears or abrasions that increase the risk of sexually transmitted infections (including HIV) if I have vaginal sex - no matter what the gender of my partner is. I have been advised that steff discussion with my doctor about my sexual practices can help determine how best to prevent and monitor for sexually transmitted infections.    11. I have been informed that testosterone can cause headaches or migraines. I understand that if I am frequently having headaches or migraines, or the pain is unusually severe, it is recommended that I talk with my healthcare provider.    12. I understand that testosterone can cause emotional changes, including increased irritability, frustration, and anger. I have been advised that my doctor can assist me in finding resources to explore and cope with these changes.    13. I understand that testosterone will result in changes that will be noticeable by other people, and that some people  in similar circumstances have experienced harassment, discrimination, and violence, while others have lost support of loved ones. I have been advised that my doctor can assist me in finding advocacy and support resources.      Prevention of Medical Complications    I agree to take testosterone as prescribed and to tell my doctor if I am not happy with the treatment or am experiencing any problems.    I understand that the right dose or type of medication prescribed for me may not be the same as for someone else.    I understand that physical examinations and blood tests are needed on a regular basis to check for negative side effects of testosterone.    I understand that testosterone can interact with other medications (including other sources of hormones), dietary supplements, herbs, alcohol, and street drugs. I understand that being honest with my doctor about what else I am taking will help prevent medical complications that could be life-threatening. I have been informed that I will continue to get medical care no matter what information I share, and will be kept confidential.    I understand that some medical conditions make it dangerous to take testosterone. I agree that I will be checked for risky conditions before the decision to take testosterone is made.    I understand that I can choose to stop taking testosterone at any time, and that it is advised that I do this with the help of my doctor to make sure there are no negative reactions to stopping. I understand that my doctor may suggest I reduce or stop taking testosterone if there are severe side effects or health risks that can t be controlled.        Effects of Masculinizing Hormone Therapy: When They Happen      *if you have a uterus, you can get pregnant while on masculinizing hormones. Masculinizing hormones are not birth control, and they can affect the development of the fetus. If you are trying to get pregnant, you should stop masculinizing  hormones and can choose to start therapy again at a later time. The effect of masculinizing hormone therapy on ovaries varies from person to person and is unknown.  **it may be possible to prevent and treat scalp hair loss

## 2023-09-08 NOTE — PROGRESS NOTES
Preceptor Attestation:    I discussed the patient with the resident and evaluated the patient in person. I have verified the content of the note, which accurately reflects my assessment of the patient and the plan of care.   Supervising Physician:  Norman Blue MD, MD.

## 2023-09-08 NOTE — PROGRESS NOTES
Assessment and Plan     Benito is a 23 year old individual that uses  pronouns They/Them/Their/Theirs that presents today for interest in masculinizing treatment to better align their body with their gender identity.    Assessment & Plan   (F64.0) Gender dysphoria in adolescent and adult  (primary encounter diagnosis)  Benito is establishing care for both primary care as well as hormone replacement therapy.  They have already been on her gender journey and is s/p mastectomy.  Today we reviewed their medical history and family medical history.  See below for items that we will need to address at next visit.  Provided informed consent from hospitalization as well as the timeline today.  Eventually patient is interested in bottom surgery.  We discussed some cursory goals as far as gender affirming triggers.  Patient does identify as nonbinary, is interested in their gender expression to be on the more masculine side.  Plan to follow-up for either 40-minute visit or to 20-minute visits to complete the intake process.  Will order labs for baseline prior to starting hormones.    (F43.10) PTSD (post-traumatic stress disorder)  Comment: Has an established mental health home    (M54.41,  G89.29) Chronic right-sided low back pain with right-sided sciatica  Comment: Possibly interested in physical therapy. Will explore at future visit     Today's visit is part of a comprehensive assessment based on the 2022 published Standards of Care for the Health of Transsexual, Transgender, and Gender-Nonconforming People, by the World Professional Association of Transgender Health (WPATH) Standards of Care Version 8 (SOC 8)    Not all items are able to be addressed in an initial visit, as this is the first visit in a comprehensive assessment. Our team will address the following items in subsequent visits if not able to be addressed today:  Relationship status and safety  Sexual orientation  Sexual health status and concerns  "  Fertility issues and preservation options  Chemical use history and needs  Gender Journey  Review DSM criteria  Review Informed consent    Terri Merchant MD                 HPI     Name and pronouns: Benito; they/them  Patient has primary care outside of [facility]? No  Seeking HRT + PCP  How referred to [facility]? internet  Presents to clinic today with:   HRT, masculinizing       Problem List     Patient Active Problem List   Diagnosis    Gender dysphoria in adolescent and adult    PTSD (post-traumatic stress disorder)    Depression    Chronic low back pain         Past Medical History     Past Medical History:   Diagnosis Date    Concussion      No Known Allergies  Current Outpatient Medications   Medication Sig Dispense Refill    buPROPion (WELLBUTRIN XL) 300 MG 24 hr tablet Take 300 mg by mouth      cetirizine (ZYRTEC) 10 MG tablet Take 10 mg by mouth      cloNIDine (CATAPRES) 0.1 MG tablet Take 0.1 mg by mouth      famotidine (PEPCID) 20 MG tablet Take 20 mg by mouth      lamoTRIgine (LAMICTAL) 100 MG tablet TAKE 1/2 TABLET BY MOUTH EVERY EVENING ALONG WITH 200 MG TABLET FOR TOTAL  MG      lamoTRIgine (LAMICTAL) 200 MG tablet Take 50 mg in the AM and 200 mg at bedtime.      LANsoprazole (PREVACID) 15 MG DR capsule Take 15 mg by mouth       History   Smoking Status    Never   Smokeless Tobacco    Never         Surgical History     Past Surgical History:   Procedure Laterality Date    ADENOIDECTOMY  2005    MASTECTOMY SIMPLE Bilateral 10/05/2022    Procedure: BILATERAL MASTECTOMY, SIMPLE WITH NO NIPPLE GRAFTING;  Surgeon: Lester Wagner MD;  Location: [facility] OR    wisdom teeth          Family History   History of clots in family (DVT, PE)? Yes - father passed from PE  History of breast cancer? Yes - mother with breast cancer  History reviewed. No pertinent family history.     Objective     /66   Pulse 66   Temp 98.4  F (36.9  C) (Oral)   Resp 16   Ht 1.778 m (5' 10\")   Wt 103.9 kg (229 lb)   " LMP 09/04/2023 (Exact Date)   SpO2 97%   BMI 32.86 kg/m    Body mass index is 32.86 kg/m .  Physical Exam  Vitals reviewed.   Constitutional:       General: Benito Nur is not in acute distress.     Appearance: Normal appearance. Benito Nur is not ill-appearing, toxic-appearing or diaphoretic.   HENT:      Head: Normocephalic and atraumatic.   Eyes:      Conjunctiva/sclera: Conjunctivae normal.   Cardiovascular:      Rate and Rhythm: Normal rate.   Pulmonary:      Effort: Pulmonary effort is normal. No respiratory distress.   Neurological:      Mental Status: Benito Nur is alert.   Psychiatric:         Mood and Affect: Mood normal.         Behavior: Behavior normal.         Thought Content: Thought content normal.         Judgment: Judgment normal.

## 2023-09-10 ENCOUNTER — MYC MEDICAL ADVICE (OUTPATIENT)
Dept: FAMILY MEDICINE | Facility: CLINIC | Age: 23
End: 2023-09-10
Payer: COMMERCIAL

## 2023-09-10 DIAGNOSIS — M54.41 CHRONIC RIGHT-SIDED LOW BACK PAIN WITH RIGHT-SIDED SCIATICA: Primary | ICD-10-CM

## 2023-09-10 DIAGNOSIS — G89.29 CHRONIC RIGHT-SIDED LOW BACK PAIN WITH RIGHT-SIDED SCIATICA: Primary | ICD-10-CM

## 2023-09-12 ENCOUNTER — TELEPHONE (OUTPATIENT)
Dept: FAMILY MEDICINE | Facility: CLINIC | Age: 23
End: 2023-09-12
Payer: COMMERCIAL

## 2023-09-12 NOTE — CONFIDENTIAL NOTE
9/12/23    Care Coordinator attempted to contact [facility] in [place] ([phone]) to get a fax number so CC can send a PT referral per patient [employer] message on 9/10. CC had to leave a message and provided direct call back number for them.      Dorota Crockett  Care Coordinator  [phone]

## 2023-09-19 ENCOUNTER — LAB (OUTPATIENT)
Dept: LAB | Facility: CLINIC | Age: 23
End: 2023-09-19
Payer: COMMERCIAL

## 2023-09-19 DIAGNOSIS — F64.0 TRANSSEXUALISM: ICD-10-CM

## 2023-09-19 LAB — HGB BLD-MCNC: 10.9 G/DL (ref 11.7–17.7)

## 2023-09-19 PROCEDURE — 80076 HEPATIC FUNCTION PANEL: CPT

## 2023-09-19 PROCEDURE — 85018 HEMOGLOBIN: CPT

## 2023-09-19 PROCEDURE — 82670 ASSAY OF TOTAL ESTRADIOL: CPT

## 2023-09-19 PROCEDURE — 82947 ASSAY GLUCOSE BLOOD QUANT: CPT

## 2023-09-19 PROCEDURE — 80061 LIPID PANEL: CPT

## 2023-09-19 PROCEDURE — 84403 ASSAY OF TOTAL TESTOSTERONE: CPT

## 2023-09-19 PROCEDURE — 36415 COLL VENOUS BLD VENIPUNCTURE: CPT

## 2023-09-20 LAB
ALBUMIN SERPL BCG-MCNC: 4.4 G/DL (ref 3.5–5.2)
ALP SERPL-CCNC: 82 U/L (ref 35–129)
ALT SERPL W P-5'-P-CCNC: 25 U/L (ref 0–70)
AST SERPL W P-5'-P-CCNC: 21 U/L (ref 0–45)
BILIRUB DIRECT SERPL-MCNC: <0.2 MG/DL (ref 0–0.3)
BILIRUB SERPL-MCNC: 0.3 MG/DL
CHOLEST SERPL-MCNC: 231 MG/DL
ESTRADIOL SERPL-MCNC: 212 PG/ML
FASTING STATUS PATIENT QL REPORTED: NORMAL
GLUCOSE SERPL-MCNC: 80 MG/DL (ref 70–99)
HDLC SERPL-MCNC: 38 MG/DL
LDLC SERPL CALC-MCNC: 173 MG/DL
NONHDLC SERPL-MCNC: 193 MG/DL
PROT SERPL-MCNC: 7.4 G/DL (ref 6.4–8.3)
TRIGL SERPL-MCNC: 100 MG/DL

## 2023-09-21 ENCOUNTER — OFFICE VISIT (OUTPATIENT)
Dept: FAMILY MEDICINE | Facility: CLINIC | Age: 23
End: 2023-09-21
Payer: COMMERCIAL

## 2023-09-21 VITALS
RESPIRATION RATE: 16 BRPM | TEMPERATURE: 97.8 F | HEIGHT: 70 IN | SYSTOLIC BLOOD PRESSURE: 106 MMHG | DIASTOLIC BLOOD PRESSURE: 65 MMHG | OXYGEN SATURATION: 98 % | WEIGHT: 222 LBS | HEART RATE: 66 BPM | BODY MASS INDEX: 31.78 KG/M2

## 2023-09-21 DIAGNOSIS — F64.0 TRANSSEXUALISM: Primary | ICD-10-CM

## 2023-09-21 LAB — TESTOST SERPL-MCNC: 31 NG/DL (ref 8–950)

## 2023-09-21 PROCEDURE — 99214 OFFICE O/P EST MOD 30 MIN: CPT | Mod: GC | Performed by: STUDENT IN AN ORGANIZED HEALTH CARE EDUCATION/TRAINING PROGRAM

## 2023-09-21 NOTE — PROGRESS NOTES
HPI     Name and pronouns: Benito  Patient has primary care outside of [facility] [place]? No  Seeking HRT + PCP    Living environment, Safety     Social History     Socioeconomic History    Marital status: Single   Tobacco Use    Smoking status: Never    Smokeless tobacco: Never   Substance and Sexual Activity    Alcohol use: Not Currently     Comment: Rare      Who lives in your household? Lives partner part time in in [place] and then part time in [place] with roommate    Has anyone hurt you physically, for example by pushing, hitting, slapping or kicking you or forcing you to have sex? Denies  Do you feel threatened or controlled by a partner, ex-partner or anyone in your life? Denies      Mental Health Assessment     Have you ever felt depressed because your gender identity and body don t match? yes  Mental Health diagnosis history Depression, PTSD (triggered aggressive men)  Mental health hospitalizations none  History of suicide attempts  no  Current suicidal thoughts?  no  Self harm   History in past   no   Current   no  Non gender related Therapist? yes  Gender therapist?    yes      Gender Journey     Gender identity   What words do you use to describe your gender identity? Non-binary  What about gender expression? Masculine  Where do you want your presentation to be? For outsiders, wants to present as a man.  What sex were you assigned at birth? female    Gender history  When did you first recognize that your body and gender identity didn t match?  First time they said something about was 4 years old (told someone they were a boy)  First time they really thought about it was 12 or 13 years old    Pre-pubertal experience   Not a traumatic, but didn't love it    Pubertal experience  Started at 10    Current body symptoms  What parts of your body or presentation make you feel uncomfortable or cause dysphoria?  Feels soft skin, round features on face, fat distribution    Have you ever seen a healthcare provider for  "gender-affirming medical interventions? Yes - mastectomy completed    Other types of supports you are using or want to start using:   Haircuts/style and gender affirmative clothing   Body removal    Social supports  Who supports you for you? Partner, partner's family([place]), best friend, best friend family ([place])  Aunt and uncle are fine    How do you spend your time? Reading, video games, hike  How well is your gender identity accepted and supported in each of these environments? Great    What restrooms do you use in public:    If they can, will use family restroom. In [place] will use women's. Overall, feels not dysphoric with bathroom choices, but rather stressed around public opinion and safety    Embodiment goals in gender identity alignment  I would like these parts of my body to stay the same: facial structure, hair  I am interested in changing these parts of my body or presentation: lower voice, increased muscle mass, more masc body  Embodiment goals can also include how your body moves through space, or what spaces you have access to (sports teams, bathrooms, etc). Other goals you have? Bottom surgery         Review of Systems:   Review of Systems   ROS: 10 point ROS neg other than the symptoms noted above in the HPI.           Physical Exam:       Vitals:    09/21/23 0942   BP: 106/65   Pulse: 66   Resp: 16   Temp: 97.8  F (36.6  C)   TempSrc: Oral   SpO2: 98%   Weight: 100.7 kg (222 lb)   Height: 1.778 m (5' 10\")     Vitals were reviewed and were normal  Physical Exam  Constitutional:       General: Benito Nur is not in acute distress.     Appearance: Normal appearance. Benito Nur is not ill-appearing, toxic-appearing or diaphoretic.   HENT:      Head: Normocephalic and atraumatic.   Eyes:      Conjunctiva/sclera: Conjunctivae normal.   Cardiovascular:      Rate and Rhythm: Normal rate.   Pulmonary:      Effort: Pulmonary effort is normal. No respiratory distress.   Neurological:      Mental Status: " Benito Nur is alert.   Psychiatric:         Mood and Affect: Mood normal.         Behavior: Behavior normal.         Thought Content: Thought content normal.         Judgment: Judgment normal.         Assessment and Plan      Benito is a 23 year old individual that uses pronouns They/Them/Their/Theirs that presents today for interest in masculinizing treatment to better align their body with their gender identity.  Benito was seen today for clinic care coordination - follow-up.    Diagnoses and all orders for this visit:    Gender dysphoria in adolescent and adult    - Informed consent completed   - able to elucidate what T can do for them, what it cannot, and what the risks are    Recheck labs in 4 weeks s/p starting T    Anemia  History of anemia per patient when they donate blood. History of iron deficiency. Check CBC + iron panel.    Today's visit is part of a comprehensive assessment based on the 2022 published Standards of Care for the Health of Transsexual, Transgender, and Gender-Nonconforming People, by the World Professional Association of Transgender Health (WPATH) Standards of Care Version 8 (SOC 8)

## 2023-09-22 RX ORDER — NEEDLES, DISPOSABLE 25GX5/8"
1 NEEDLE, DISPOSABLE MISCELLANEOUS WEEKLY
Qty: 100 EACH | Refills: 1 | Status: SHIPPED | OUTPATIENT
Start: 2023-09-22 | End: 2023-12-19

## 2023-09-22 RX ORDER — SYRINGE, DISPOSABLE, 1 ML
1 SYRINGE, EMPTY DISPOSABLE MISCELLANEOUS WEEKLY
Qty: 100 EACH | Refills: 1 | Status: SHIPPED | OUTPATIENT
Start: 2023-09-22 | End: 2023-12-19

## 2023-09-22 RX ORDER — TESTOSTERONE CYPIONATE 200 MG/ML
30 INJECTION, SOLUTION INTRAMUSCULAR WEEKLY
Qty: 4 ML | Refills: 0 | Status: SHIPPED | OUTPATIENT
Start: 2023-09-22 | End: 2023-12-19

## 2023-09-22 NOTE — PATIENT INSTRUCTIONS
"Patient Education   Here is the plan from today's visit    Sorry for the one day delay for the testosterone sending!  Plan to check labs in 4 weeks after starting T (mid-cycle draw) and then seeing each other 5 weeks    1. Gender dysphoria in adolescent and adult  - Needle, Disp, (B-D DISP NEEDLE) 25G X 1\" MISC; Inject 1 each into the muscle once a week  Dispense: 100 each; Refill: 1  - Needle, Disp, (B-D BLUNT FILL NEEDLE) 18G X 1-1/2\" MISC; 1 each once a week  Dispense: 100 each; Refill: 1  - syringe, disposable, (B-D SYRINGE LUER-ASHLEY) 1 ML MISC; 1 each once a week  Dispense: 100 each; Refill: 1  - testosterone cypionate (DEPOTESTOSTERONE) 200 MG/ML injection; Inject 0.15 mLs (30 mg) into the muscle once a week  Dispense: 4 mL; Refill: 0      Follow up plan  Return in about 4 weeks (around 10/19/2023) for Follow up HRT.    Thank you for coming to [facility] today.  Medication Refills:  If you need any refills please call your pharmacy and they will contact us.   If you need to  your refill at a new pharmacy, please contact the new pharmacy directly. The new pharmacy will help you get your medications transferred faster.   Scheduling:  If you have any concerns about today's visit or wish to schedule another appointment please call our office during normal business hours [phone] (8-5:00 M-F). If you can no longer make a scheduled visit, please cancel via food.de or call us to cancel.   Medical Concerns:  If you have urgent medical concerns please call [phone] at any time of the day.    Terri Merchant MD      "

## 2023-09-25 NOTE — PROGRESS NOTES
Preceptor Attestation:   Patient seen, evaluated and discussed with the resident. I have verified the content of the note, which accurately reflects my assessment of the patient and the plan of care.   Supervising Physician:  Ivette Lester, DO

## 2023-09-28 ENCOUNTER — TELEPHONE (OUTPATIENT)
Dept: FAMILY MEDICINE | Facility: CLINIC | Age: 23
End: 2023-09-28

## 2023-09-28 NOTE — TELEPHONE ENCOUNTER
[facility] phone call message- patient requesting to speak with PCP or provider:    PCP: Terri Merchant    Additional Comments: Patient seeking callback from triage nurse to r/s injection teaching.     Is a call back needed? Yes    Patient informed that it may take up to 2 business days to hear back from PCP:Yes    OK to leave a message on voice mail? Yes    Primary language: English      needed? No    Call taken on September 28, 2023 at 9:57 AM by Moi Mccartney

## 2023-10-03 ENCOUNTER — ALLIED HEALTH/NURSE VISIT (OUTPATIENT)
Dept: FAMILY MEDICINE | Facility: CLINIC | Age: 23
End: 2023-10-03
Payer: COMMERCIAL

## 2023-10-03 DIAGNOSIS — F64.0 TRANSSEXUALISM: Primary | ICD-10-CM

## 2023-10-03 PROCEDURE — 99207 PR NO CHARGE NURSE ONLY: CPT

## 2023-10-03 NOTE — PROGRESS NOTES
"Patient presented to clinic with all supplies for testosterone injection teaching. Patient's friend was present at visit.     RN reviewed necessary supplies: difference in needles (drawing up vs injecting), how to read a syringe, how to read medication label, disposal of sharps, and proper hand hygiene.     Discussed how to switch out needles and patient demonstrated understanding of reading syringe. RN reviewed safe needle handling (using \"scoop\" method). Patient independently ivan up proper amount of Testosterone.     RN discussed site for IM injection and reviewed proper IM injection technique. Patient practiced using injecting pad.    At end of visit, patient independently completed self-injection of 0.15 mL (30 mg) Testosterone into left thigh under supervision of RN.    Completed visit with discussion of refill policy.     Patient verbalized understanding and was engaged during appointment.    Dr. Kate was the preceptor on site for this visit and available for questions.    Ania Sadler RN  "

## 2023-11-03 ENCOUNTER — LAB (OUTPATIENT)
Dept: LAB | Facility: CLINIC | Age: 23
End: 2023-11-03
Payer: COMMERCIAL

## 2023-11-03 DIAGNOSIS — F64.0 TRANSSEXUALISM: ICD-10-CM

## 2023-11-03 LAB
ALBUMIN SERPL BCG-MCNC: 4.2 G/DL (ref 3.5–5.2)
ALP SERPL-CCNC: 74 U/L (ref 35–129)
ALT SERPL W P-5'-P-CCNC: 20 U/L (ref 0–70)
AST SERPL W P-5'-P-CCNC: 18 U/L (ref 0–45)
BILIRUB DIRECT SERPL-MCNC: <0.2 MG/DL (ref 0–0.3)
BILIRUB SERPL-MCNC: 0.3 MG/DL
ESTRADIOL SERPL-MCNC: 57 PG/ML
FASTING STATUS PATIENT QL REPORTED: ABNORMAL
GLUCOSE SERPL-MCNC: 109 MG/DL (ref 70–99)
HGB BLD-MCNC: 10.5 G/DL (ref 11.7–17.7)
PROT SERPL-MCNC: 7 G/DL (ref 6.4–8.3)

## 2023-11-03 PROCEDURE — 84403 ASSAY OF TOTAL TESTOSTERONE: CPT

## 2023-11-03 PROCEDURE — 85018 HEMOGLOBIN: CPT

## 2023-11-03 PROCEDURE — 36415 COLL VENOUS BLD VENIPUNCTURE: CPT

## 2023-11-03 PROCEDURE — 80076 HEPATIC FUNCTION PANEL: CPT

## 2023-11-03 PROCEDURE — 82947 ASSAY GLUCOSE BLOOD QUANT: CPT

## 2023-11-03 PROCEDURE — 82670 ASSAY OF TOTAL ESTRADIOL: CPT

## 2023-11-07 LAB — TESTOST SERPL-MCNC: 223 NG/DL (ref 8–950)

## 2024-01-05 ENCOUNTER — OFFICE VISIT (OUTPATIENT)
Dept: FAMILY MEDICINE | Facility: CLINIC | Age: 24
End: 2024-01-05
Payer: COMMERCIAL

## 2024-01-05 VITALS
OXYGEN SATURATION: 99 % | WEIGHT: 233.4 LBS | BODY MASS INDEX: 33.49 KG/M2 | RESPIRATION RATE: 17 BRPM | SYSTOLIC BLOOD PRESSURE: 105 MMHG | HEART RATE: 72 BPM | DIASTOLIC BLOOD PRESSURE: 73 MMHG

## 2024-01-05 DIAGNOSIS — D50.0 IRON DEFICIENCY ANEMIA DUE TO CHRONIC BLOOD LOSS: ICD-10-CM

## 2024-01-05 DIAGNOSIS — F64.0 TRANSSEXUALISM: Primary | ICD-10-CM

## 2024-01-05 DIAGNOSIS — Z00.00 PREVENTATIVE HEALTH CARE: ICD-10-CM

## 2024-01-05 DIAGNOSIS — D64.9 ANEMIA, UNSPECIFIED TYPE: ICD-10-CM

## 2024-01-05 LAB
ERYTHROCYTE [DISTWIDTH] IN BLOOD BY AUTOMATED COUNT: 15.9 % (ref 10–15)
HCT VFR BLD AUTO: 38.2 % (ref 35–53)
HGB BLD-MCNC: 11.5 G/DL (ref 11.7–17.7)
MCH RBC QN AUTO: 22.3 PG (ref 26.5–33)
MCHC RBC AUTO-ENTMCNC: 30.1 G/DL (ref 31.5–36.5)
MCV RBC AUTO: 74 FL (ref 78–100)
PLATELET # BLD AUTO: 338 10E3/UL (ref 150–450)
RBC # BLD AUTO: 5.16 10E6/UL (ref 3.8–5.9)
WBC # BLD AUTO: 5.1 10E3/UL (ref 4–11)

## 2024-01-05 PROCEDURE — 85027 COMPLETE CBC AUTOMATED: CPT | Performed by: STUDENT IN AN ORGANIZED HEALTH CARE EDUCATION/TRAINING PROGRAM

## 2024-01-05 PROCEDURE — 86803 HEPATITIS C AB TEST: CPT | Performed by: STUDENT IN AN ORGANIZED HEALTH CARE EDUCATION/TRAINING PROGRAM

## 2024-01-05 PROCEDURE — 87389 HIV-1 AG W/HIV-1&-2 AB AG IA: CPT | Performed by: STUDENT IN AN ORGANIZED HEALTH CARE EDUCATION/TRAINING PROGRAM

## 2024-01-05 PROCEDURE — 80061 LIPID PANEL: CPT | Performed by: STUDENT IN AN ORGANIZED HEALTH CARE EDUCATION/TRAINING PROGRAM

## 2024-01-05 PROCEDURE — 83550 IRON BINDING TEST: CPT | Performed by: STUDENT IN AN ORGANIZED HEALTH CARE EDUCATION/TRAINING PROGRAM

## 2024-01-05 PROCEDURE — 99214 OFFICE O/P EST MOD 30 MIN: CPT | Mod: GC | Performed by: STUDENT IN AN ORGANIZED HEALTH CARE EDUCATION/TRAINING PROGRAM

## 2024-01-05 PROCEDURE — 82728 ASSAY OF FERRITIN: CPT | Performed by: STUDENT IN AN ORGANIZED HEALTH CARE EDUCATION/TRAINING PROGRAM

## 2024-01-05 PROCEDURE — 36415 COLL VENOUS BLD VENIPUNCTURE: CPT | Performed by: STUDENT IN AN ORGANIZED HEALTH CARE EDUCATION/TRAINING PROGRAM

## 2024-01-05 PROCEDURE — 83540 ASSAY OF IRON: CPT | Performed by: STUDENT IN AN ORGANIZED HEALTH CARE EDUCATION/TRAINING PROGRAM

## 2024-01-05 RX ORDER — SYRINGE, DISPOSABLE, 1 ML
SYRINGE, EMPTY DISPOSABLE MISCELLANEOUS
COMMUNITY
Start: 2023-09-22

## 2024-01-05 RX ORDER — TESTOSTERONE CYPIONATE 200 MG/ML
30 INJECTION, SOLUTION INTRAMUSCULAR WEEKLY
Qty: 4 ML | Refills: 0 | Status: SHIPPED | OUTPATIENT
Start: 2024-01-05 | End: 2024-05-31

## 2024-01-05 RX ORDER — OMEPRAZOLE 20 MG/1
20 TABLET, DELAYED RELEASE ORAL DAILY
COMMUNITY

## 2024-01-05 NOTE — PROGRESS NOTES
Assessment & Plan     Gender dysphoria in adolescent and adult  - Patient happy with results thus far; would like to continue dose  - While TT <250; E is > 40, so do not need to adjust for bone health. Will continue at same dose  - Interested in hysterectomy; placed referral through [facility] referral pool  - Follow up in 3 months for labs + check if would like to increase dose slowly    - testosterone cypionate (DEPOTESTOSTERONE) 200 MG/ML injection; Inject 0.15 mLs (30 mg) into the muscle once a week  - Comprehensive Gender Care Referral; Future  - Lipid panel; Future    Anemia, unspecified type  Iron deficiency anemia due to chronic blood loss  History of anemia on hemoglobin checks- Suspect iron deficiency secondary to chronic blood loss with monthly bleeds. Will further explore with labs today. If consistent will start with oral supplementation and follow up labs at 3 month sugey    - Iron and iron binding capacity; Future  - Ferritin; Future  - CBC with platelets; Future    No follow-ups on file.    Terri Merchant MD  [facility] CHRISTOPHE Oliver is a 23 year old, presenting for the following health issues:  RECHECK (Pt here lab follow up)        1/5/2024     4:03 PM   Additional Questions   Roomed by Doua   Accompanied by Self         1/5/2024     4:03 PM   Patient Reported Additional Medications   Patient reports taking the following new medications n/a       HPI   Didn't change dose  - wants to wait and see what is happening at this dose  - don't want any more intense puberty feelings  - already noticing change        Objective    /73 (BP Location: Left arm, Patient Position: Sitting, Cuff Size: Adult Large)   Pulse 72   Resp 17   Wt 105.9 kg (233 lb 6.4 oz)   SpO2 99%   BMI 33.49 kg/m    Body mass index is 33.49 kg/m .  Physical Exam  Constitutional:       General: Benito Nur is not in acute distress.     Appearance: Normal appearance. Benito Nur is not  ill-appearing, toxic-appearing or diaphoretic.   HENT:      Head: Normocephalic and atraumatic.   Eyes:      Conjunctiva/sclera: Conjunctivae normal.   Cardiovascular:      Rate and Rhythm: Normal rate.   Pulmonary:      Effort: Pulmonary effort is normal. No respiratory distress.   Neurological:      Mental Status: Benito Nur is alert.   Psychiatric:         Mood and Affect: Mood normal.         Behavior: Behavior normal.         Thought Content: Thought content normal.         Judgment: Judgment normal.

## 2024-01-05 NOTE — Clinical Note
Please abstract the following data from this visit with this patient into the appropriate field in Epic:  Other Tests found in the patient's chart through Chart Review/Care Everywhere:  Pap smear done by this group [facility] on this date: 6/15/2022  Note to Abstraction: If this section is blank, no results were found via Chart Review/Care Everywhere.

## 2024-01-06 LAB
CHOLEST SERPL-MCNC: 205 MG/DL
FASTING STATUS PATIENT QL REPORTED: YES
FERRITIN SERPL-MCNC: 9 NG/ML (ref 6–409)
HCV AB SERPL QL IA: NONREACTIVE
HDLC SERPL-MCNC: 37 MG/DL
HIV 1+2 AB+HIV1 P24 AG SERPL QL IA: NONREACTIVE
IRON BINDING CAPACITY (ROCHE): 392 UG/DL (ref 240–430)
IRON SATN MFR SERPL: 4 % (ref 15–46)
IRON SERPL-MCNC: 15 UG/DL (ref 37–157)
LDLC SERPL CALC-MCNC: 161 MG/DL
NONHDLC SERPL-MCNC: 168 MG/DL
TRIGL SERPL-MCNC: 35 MG/DL

## 2024-01-12 PROBLEM — D50.0 IRON DEFICIENCY ANEMIA DUE TO CHRONIC BLOOD LOSS: Status: ACTIVE | Noted: 2024-01-12

## 2024-05-31 DIAGNOSIS — F64.0 TRANSSEXUALISM: ICD-10-CM

## 2024-05-31 RX ORDER — TESTOSTERONE CYPIONATE 200 MG/ML
INJECTION, SOLUTION INTRAMUSCULAR
Qty: 4 ML | Refills: 1 | Status: SHIPPED | OUTPATIENT
Start: 2024-05-31 | End: 2024-09-23

## 2024-05-31 NOTE — TELEPHONE ENCOUNTER
Patient is due for a visit, but that visit is mostly driven by patient preference; however, their levels are safe and doing well. Needs 6 month follow up.

## 2024-07-02 ENCOUNTER — TRANSFERRED RECORDS (OUTPATIENT)
Dept: MULTI SPECIALTY CLINIC | Facility: CLINIC | Age: 24
End: 2024-07-02

## 2024-07-02 LAB — CHLAMYDIA - HIM PATIENT REPORTED: NORMAL

## 2024-09-19 ENCOUNTER — TELEPHONE (OUTPATIENT)
Dept: FAMILY MEDICINE | Facility: CLINIC | Age: 24
End: 2024-09-19
Payer: COMMERCIAL

## 2024-09-19 NOTE — TELEPHONE ENCOUNTER
LVM 9/19 @12pm #[id number] telling pt to call us back regarding the appt type. Pt can come in for an office visit, but would have to do the physical at a later date. Reason: [employer] scheduling error overbooked Dr. Ribeiro

## 2024-10-06 ENCOUNTER — HEALTH MAINTENANCE LETTER (OUTPATIENT)
Age: 24
End: 2024-10-06

## 2024-10-16 ENCOUNTER — LAB (OUTPATIENT)
Dept: LAB | Facility: CLINIC | Age: 24
End: 2024-10-16
Payer: COMMERCIAL

## 2024-10-16 DIAGNOSIS — F64.0 GENDER DYSPHORIA IN ADULT: ICD-10-CM

## 2024-10-16 LAB
ALBUMIN SERPL BCG-MCNC: 4.4 G/DL (ref 3.5–5.2)
ALP SERPL-CCNC: 91 U/L (ref 40–150)
ALT SERPL W P-5'-P-CCNC: 20 U/L (ref 0–70)
AST SERPL W P-5'-P-CCNC: 21 U/L (ref 0–45)
BASOPHILS # BLD AUTO: 0 10E3/UL (ref 0–0.2)
BASOPHILS NFR BLD AUTO: 1 %
BILIRUB DIRECT SERPL-MCNC: <0.2 MG/DL (ref 0–0.3)
BILIRUB SERPL-MCNC: 0.3 MG/DL
CHOLEST SERPL-MCNC: 198 MG/DL
EOSINOPHIL # BLD AUTO: 0.2 10E3/UL (ref 0–0.7)
EOSINOPHIL NFR BLD AUTO: 3 %
ERYTHROCYTE [DISTWIDTH] IN BLOOD BY AUTOMATED COUNT: 13.6 % (ref 10–15)
ESTRADIOL SERPL-MCNC: 69 PG/ML
FASTING STATUS PATIENT QL REPORTED: YES
HCT VFR BLD AUTO: 43.6 % (ref 35–53)
HDLC SERPL-MCNC: 34 MG/DL
HGB BLD-MCNC: 14.5 G/DL (ref 11.7–17.7)
IMM GRANULOCYTES # BLD: 0 10E3/UL
IMM GRANULOCYTES NFR BLD: 0 %
LDLC SERPL CALC-MCNC: 150 MG/DL
LYMPHOCYTES # BLD AUTO: 2.7 10E3/UL (ref 0.8–5.3)
LYMPHOCYTES NFR BLD AUTO: 43 %
MCH RBC QN AUTO: 27.6 PG (ref 26.5–33)
MCHC RBC AUTO-ENTMCNC: 33.3 G/DL (ref 31.5–36.5)
MCV RBC AUTO: 83 FL (ref 78–100)
MONOCYTES # BLD AUTO: 0.4 10E3/UL (ref 0–1.3)
MONOCYTES NFR BLD AUTO: 7 %
NEUTROPHILS # BLD AUTO: 2.9 10E3/UL (ref 1.6–8.3)
NEUTROPHILS NFR BLD AUTO: 46 %
NONHDLC SERPL-MCNC: 164 MG/DL
PLATELET # BLD AUTO: 267 10E3/UL (ref 150–450)
PROT SERPL-MCNC: 7.4 G/DL (ref 6.4–8.3)
RBC # BLD AUTO: 5.26 10E6/UL (ref 3.8–5.9)
TRIGL SERPL-MCNC: 69 MG/DL
WBC # BLD AUTO: 6.3 10E3/UL (ref 4–11)

## 2024-10-16 PROCEDURE — 80061 LIPID PANEL: CPT

## 2024-10-16 PROCEDURE — 82670 ASSAY OF TOTAL ESTRADIOL: CPT

## 2024-10-16 PROCEDURE — 80076 HEPATIC FUNCTION PANEL: CPT

## 2024-10-16 PROCEDURE — 84403 ASSAY OF TOTAL TESTOSTERONE: CPT

## 2024-10-16 PROCEDURE — 36415 COLL VENOUS BLD VENIPUNCTURE: CPT

## 2024-10-16 PROCEDURE — 85025 COMPLETE CBC W/AUTO DIFF WBC: CPT

## 2024-10-16 SDOH — HEALTH STABILITY: PHYSICAL HEALTH: ON AVERAGE, HOW MANY MINUTES DO YOU ENGAGE IN EXERCISE AT THIS LEVEL?: 40 MIN

## 2024-10-16 SDOH — HEALTH STABILITY: PHYSICAL HEALTH: ON AVERAGE, HOW MANY DAYS PER WEEK DO YOU ENGAGE IN MODERATE TO STRENUOUS EXERCISE (LIKE A BRISK WALK)?: 3 DAYS

## 2024-10-16 ASSESSMENT — SOCIAL DETERMINANTS OF HEALTH (SDOH): HOW OFTEN DO YOU GET TOGETHER WITH FRIENDS OR RELATIVES?: ONCE A WEEK

## 2024-10-18 ENCOUNTER — OFFICE VISIT (OUTPATIENT)
Dept: FAMILY MEDICINE | Facility: CLINIC | Age: 24
End: 2024-10-18
Payer: COMMERCIAL

## 2024-10-18 VITALS
OXYGEN SATURATION: 99 % | HEART RATE: 94 BPM | HEIGHT: 70 IN | WEIGHT: 222 LBS | BODY MASS INDEX: 31.78 KG/M2 | TEMPERATURE: 98.4 F | RESPIRATION RATE: 16 BRPM | DIASTOLIC BLOOD PRESSURE: 65 MMHG | SYSTOLIC BLOOD PRESSURE: 101 MMHG

## 2024-10-18 DIAGNOSIS — G89.29 CHRONIC BILATERAL LOW BACK PAIN WITHOUT SCIATICA: Primary | ICD-10-CM

## 2024-10-18 DIAGNOSIS — M54.50 CHRONIC BILATERAL LOW BACK PAIN WITHOUT SCIATICA: Primary | ICD-10-CM

## 2024-10-18 DIAGNOSIS — F64.0 TRANSSEXUALISM: ICD-10-CM

## 2024-10-18 DIAGNOSIS — R07.81 RIB PAIN: ICD-10-CM

## 2024-10-18 PROCEDURE — 99214 OFFICE O/P EST MOD 30 MIN: CPT | Mod: GC

## 2024-10-18 RX ORDER — TESTOSTERONE CYPIONATE 200 MG/ML
50 INJECTION, SOLUTION INTRAMUSCULAR WEEKLY
Qty: 4 ML | Refills: 1 | Status: SHIPPED | OUTPATIENT
Start: 2024-10-18

## 2024-10-18 ASSESSMENT — PATIENT HEALTH QUESTIONNAIRE - PHQ9
10. IF YOU CHECKED OFF ANY PROBLEMS, HOW DIFFICULT HAVE THESE PROBLEMS MADE IT FOR YOU TO DO YOUR WORK, TAKE CARE OF THINGS AT HOME, OR GET ALONG WITH OTHER PEOPLE: VERY DIFFICULT
SUM OF ALL RESPONSES TO PHQ QUESTIONS 1-9: 18
SUM OF ALL RESPONSES TO PHQ QUESTIONS 1-9: 18

## 2024-10-18 NOTE — PATIENT INSTRUCTIONS
"Patient Education   Here is the plan from today's visit    1. Chronic bilateral low back pain without sciatica    - Physical Therapy  Referral; Future    2.  Mental Health  - continue therapies  cYour emotional health is important to us!  If you feel that you may hurt yourself or others, please seek help through the hospital ER, or 9-1-1.  You may also call [place] Crisis [facility], toll-free, at 1.540.778.5447 for immediate support.     3. Gender  - testosterone increased today    The National Suicide Prevention Lifeline at 988 can be reached 24/7.    Trans Lifeline can be reached at 112-772-3418.   For LGBT youth (ages 24 and younger) contemplating suicide, the [employer] Project Lifeline can be reached at 1-383.251.1395.  [place] Program: Text \"start\" to 318 952       Please call or return to clinic if your symptoms don't go away.    Follow up plan  No follow-ups on file.    Thank you for coming to [facility] today.  Lab Testing:  **If you had lab testing today and your results are reassuring or normal they will be mailed to you or sent through [employer] within 7 days.   **If the lab tests need quick action we will call you with the results.  **If you are having labs done on a different day, please call [phone] to schedule at [facility] or [phone] for other [facility] locations. Labs do not offer walk-in appointments.  The phone number we will call with results is # [phone] (home) . If this is not the best number please call our clinic and change the number.  Medication Refills:  If you need any refills please call your pharmacy and they will contact us.   If you need to  your refill at a new pharmacy, please contact the new pharmacy directly. The new pharmacy will help you get your medications transferred faster.   Scheduling:  If you have any concerns about today's visit or wish to schedule another appointment please call our office during normal business " hours [phone] (8-5:00 M-F). If you can no longer make a scheduled visit, please cancel via FRUCT or call us to cancel.   If a referral was made to an [facility] specialty provider and you do not get a call from central scheduling, please refer to directions on your visit summary or call our office during normal business hours for assistance.   If a Mammogram was ordered for you at the Breast Center call [phone] to schedule or change your appointment.  If you had an XRay/CT/Ultrasound/MRI ordered the number is [phone] to schedule or change your radiology appointment.   [facility] has limited ultrasound appointments available on Wednesdays, if you would like your ultrasound at [facility], please call [phone] to schedule.   Medical Concerns:  If you have urgent medical concerns please call [phone] at any time of the day.    Melissa Ribeiro MD

## 2024-10-18 NOTE — PROGRESS NOTES
Preventive Care Visit  [facility] CHRISTOPHE Ribeiro MD, Family Medicine  Oct 18, 2024  {Provider  Link to SmartSet :806447}    {PROVIDER CHARTING PREFERENCE:423449}    Etta Oliver is a 24 year old, presenting for the following:  Physical (PT referral, lower back pain. Random chest pain for years. Has gotten more apparent past half year )        10/18/2024     2:18 PM   Additional Questions   Roomed by Diane   Accompanied by self         10/18/2024    Information    services provided? No         {ALERT  Recent PHQ-9/EPDS score indicates suicidal ideations, document follow-up within the A/P section of the note :285822}{Provider Documentation  Link to C-SSRS ([facility]) Flowsheet :397666}  Via the Health Maintenance questionnaire, the patient has reported the following services have been completed -[facility], this information has been sent to the abstraction team.  Health Care Directive  Patient does not have a Health Care Directive or Living Will: {ADVANCE_DIRECTIVE_STATUS:034780}    HPI  ***  {MA/LPN/RN Pre-Provider Visit Orders- hCG/UA/Strep (Optional):745251}  {SUPERLIST (Optional):130024}  {additonal problems for provider to add (Optional):697608}      10/16/2024   General Health   How would you rate your overall physical health? (!) FAIR   Feel stress (tense, anxious, or unable to sleep) Rather much      (!) STRESS CONCERN      10/16/2024   Nutrition   Three or more servings of calcium each day? Yes   Diet: Regular (no restrictions)   How many servings of fruit and vegetables per day? (!) 2-3   How many sweetened beverages each day? 0-1            10/16/2024   Exercise   Days per week of moderate/strenous exercise 3 days   Average minutes spent exercising at this level 40 min            10/16/2024   Social Factors   Frequency of gathering with friends or relatives Once a week   Worry food won't last until get money to buy more  No   Food not last or not have enough money for food? No   Do you have housing? (Housing is defined as stable permanent housing and does not include staying ouside in a car, in a tent, in an abandoned building, in an overnight shelter, or couch-surfing.) Yes   Are you worried about losing your housing? No   Lack of transportation? No   Unable to get utilities (heat,electricity)? No            10/16/2024   Dental   Dentist two times every year? Yes            10/16/2024   TB Screening   Were you born outside of the [place]? No          Today's PHQ-9 Score:       10/18/2024     2:04 PM   PHQ-9 SCORE   PHQ-9 Total Score MyChart 18 (Moderately severe depression)   PHQ-9 Total Score 18         10/16/2024   Substance Use   Alcohol more than 3/day or more than 7/wk No   Do you use any other substances recreationally? No        Social History     Tobacco Use    Smoking status: Never    Smokeless tobacco: Never   Substance Use Topics    Alcohol use: Not Currently     Comment: Rare    Drug use: Yes     Types: Marijuana     {Provider  If there are gaps in the social history shown above, please follow the link to update and then refresh the note Link to Social and Substance History :637389}      10/16/2024   STI Screening   New sexual partner(s) since last STI/HIV test? (!) YES ***        History of abnormal Pap smear: { :325388}        6/15/2022    11:00 AM   PAP / HPV   PAP-ABSTRACT See Scanned Document           This result is from an external source.           10/16/2024   Contraception/Family Planning   Questions about contraception or family planning No        {Provider  REQUIRED FOR AWV Use the storyboard to review patient history, after sections have been marked as reviewed, refresh note to capture documentation:197561}   Reviewed and updated as needed this visit by Provider                    {HISTORY OPTIONS (Optional):635666}    {ROS Picklists (Optional):606285}     Objective    Exam  There were no vitals taken for this  "visit.   Estimated body mass index is 33.49 kg/m  as calculated from the following:    Height as of 9/21/23: 1.778 m (5' 10\").    Weight as of 1/5/24: 105.9 kg (233 lb 6.4 oz).    Physical Exam  {Exam Choices (Optional):047091}        Signed Electronically by: Melissa Ribeiro MD  {Email feedback regarding this note to [email or web address]   :619143}  Answers submitted by the patient for this visit:  Patient Health Questionnaire (Submitted on 10/18/2024)  If you checked off any problems, how difficult have these problems made it for you to do your work, take care of things at home, or get along with other people?: Very difficult  PHQ9 TOTAL SCORE: 18    "

## 2024-10-18 NOTE — PROGRESS NOTES
"  Assessment & Plan     Chronic right low back pain without sciatica  Chronic right low back pain, without red flag symptoms or signs on exam. Previously improved with physical therapy.  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 This has never been evaluated. On exam, minimal concern for cardiovascular or pulmonary cause. Offered EKG today, which pt declined. Reproducible chest wall tenderness suggests musculoskeletal source most likely.  - OTC ibuprofen and tylenol    Depression  Benito endorses feeling down but notes that they feel they have the support and resources to get them through, including trauma based therapy and a plan to pursue ketamine therapy in the near future. Also follows with psychiatry.  - Continue with current therapies and psychiatry follow up  - Crisis resources verbally and in AVS    Gender dysphoria in adolescent and adult  Benito has noticed masculinizing changes since starting exogenous testosterone and is happy with these results, though they would like to increase the dose and see more prominent changes with a goal of a more masculine overall gender presentation. Estrogen is >40, so very low concern for bone health at this point. Plan to increase dose from 30 to 50 once weekly and plan for close follow up in 3, 6, and 9 months for repeat testing and assessment of results.   - testosterone cypionate (DEPOTESTOSTERONE) 200 MG/ML injection; Inject 0.25 mLs (50 mg) into the muscle once a week.      BMI  Estimated body mass index is 31.85 kg/m  as calculated from the following:    Height as of this encounter: 1.778 m (5' 10\").    Weight as of this encounter: 100.7 kg (222 lb).       Depression Screening Follow Up        10/18/2024     " 2:04 PM   PHQ   PHQ-9 Total Score 18   Q9: Thoughts of better off dead/self-harm past 2 weeks More than half the days   F/U: Thoughts of suicide or self-harm No   F/U: Safety concerns No                  No data to display                      Follow Up Actions Taken  Crisis resource information provided in the After Visit Summary    Discussed the following ways the patient can remain in a safe environment:  be around others  Counseling  Appropriate preventive services were addressed with this patient via screening, questionnaire, or discussion as appropriate for fall prevention, nutrition, physical activity, Tobacco-use cessation, social engagement, weight loss and cognition.  Checklist reviewing preventive services available has been given to the patient.  Reviewed patient's diet, addressing concerns and/or questions.   Benito is at risk for lack of exercise and has been provided with information to increase physical activity for the benefit of Benito's well-being.   The patient's PHQ-9 score is consistent with moderate depression. Benito was provided with information regarding depression.       See Patient Instructions    Return in about 3 months (around 1/18/2025) for Follow up gender care.    Etta Oliver is a 24 year old, presenting for the following health issues:  Physical (PT referral, lower back pain. Random chest pain for years. Has gotten more apparent past half year ), testosterone therapy, depressed mood        10/18/2024     2:18 PM   Additional Questions   Roomed by Diane   Accompanied by self         10/18/2024    Information    services provided? No        Via the Health Maintenance questionnaire, the patient has reported the following services have been completed -[facility], this information has been sent to the abstraction team.  HPI       Gender:Patient has noticed their voice is deeper, their facial hair is more prominent, and they  "seem to be more muscular. They are pleased with these results and inquire about increasing testosterone dose to continue to see more masculinizing changes.    Chest: Intermittent \"sharp\" chest pain for the past 10 years. More frequent in the past 6 months. Intermittently on the L and R, L more often than R. Worse with certain positions, coughing, laughing. Improves with rest. Not associated with shortness of breath, palpitations, sweating, bruising, rashes, or new recent injuries.    Back Pain: present for some years. Worse lately. Better with physical therapy in the past. Would like referral for additional PT. Also improves with ibuprofen. Worse with activity.    Back Pain Red Flag symptoms:  Patient denies fever, night sweats, immunosuppression, HIV, anticoagulant use, prior malignancy, unintentional weight loss, nocturnal pain, and pain unchanged despite position. Patient does not have focal spinal tenderness to palpation on exam.  No saddle anesthesia, foot drop, leg weakness, or loss of bowel or bladder function          Objective    /65   Pulse 94   Temp 98.4  F (36.9  C) (Oral)   Resp 16   Ht 1.778 m (5' 10\")   Wt 100.7 kg (222 lb)   SpO2 99%   BMI 31.85 kg/m    Body mass index is 31.85 kg/m .  Physical Exam     Constitutional: Alert, conversant, in NAD  HEENT: EOM are grossly intact  CHEST:  Tenderness to palpation over the mid-axillary thorax on the L  No rash, bruising, laceration.  Equal breath sounds bilaterally, clear to auscultation.  RRR without murmur or other extra sounds.  BACK:   Bony tenderness: None   Paraspinal tenderness: R lower paraspinal tenderness present  Sensation: intact in b/l lower extremities.   Strength: 5/5 w/ dorsiflexion/ plantarflexion/ inversion/ eversion/ knee flexion/ extension.   Maneuvers: Neg straight leg raise b/l. Neg LIZ   Neuro: DTR's 2+.  Psych: normal affect, depressed mood, no SI            Angus Cui, MS4  [facility]  3:23 PM " 10/18/2024    I was present with the medical student who participated in the service and in the documentation of this note. I have verified the history and personally performed the physical exam and medical decision making, and have verified the content of the note, which accurately reflects my assessment of the patient and the plan of care.   Melissa Ribeiro MD     Signed Electronically by: Melissa Ribeiro MD    Answers submitted by the patient for this visit:  Patient Health Questionnaire (Submitted on 10/18/2024)  If you checked off any problems, how difficult have these problems made it for you to do your work, take care of things at home, or get along with other people?: Very difficult  PHQ9 TOTAL SCORE: 18      10/18/2024     2:04 PM   PHQ   PHQ-9 Total Score 18   Q9: Thoughts of better off dead/self-harm past 2 weeks More than half the days   F/U: Thoughts of suicide or self-harm No   F/U: Safety concerns No    Is in two therapies: ketamine and trauma based therapy  Psychiatry as well

## 2024-10-20 LAB — TESTOST SERPL-MCNC: 342 NG/DL (ref 8–950)

## 2024-10-24 ENCOUNTER — THERAPY VISIT (OUTPATIENT)
Dept: PHYSICAL THERAPY | Facility: CLINIC | Age: 24
End: 2024-10-24
Attending: FAMILY MEDICINE
Payer: COMMERCIAL

## 2024-10-24 DIAGNOSIS — G89.29 CHRONIC RIGHT-SIDED LOW BACK PAIN WITHOUT SCIATICA: Primary | ICD-10-CM

## 2024-10-24 DIAGNOSIS — M54.50 CHRONIC BILATERAL LOW BACK PAIN WITHOUT SCIATICA: ICD-10-CM

## 2024-10-24 DIAGNOSIS — G89.29 CHRONIC BILATERAL LOW BACK PAIN WITHOUT SCIATICA: ICD-10-CM

## 2024-10-24 DIAGNOSIS — M54.50 CHRONIC RIGHT-SIDED LOW BACK PAIN WITHOUT SCIATICA: Primary | ICD-10-CM

## 2024-10-24 PROCEDURE — 97161 PT EVAL LOW COMPLEX 20 MIN: CPT | Mod: GP | Performed by: PHYSICAL THERAPIST

## 2024-10-24 PROCEDURE — 97110 THERAPEUTIC EXERCISES: CPT | Mod: GP | Performed by: PHYSICAL THERAPIST

## 2024-10-24 NOTE — PROGRESS NOTES
PHYSICAL THERAPY EVALUATION  Type of Visit: Evaluation        Fall Risk Screen:  Fall screen completed by: PT  Have you fallen 2 or more times in the past year?: No  Have you fallen and had an injury in the past year?: No  Is patient a fall risk?: No    Subjective       Presenting condition or subjective complaint: (Patient-Rptd) Lower right back pain and can travel down my leg. My leg becomes achey, sometimes just my hip or it can travel down my leg through my calf.  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    Prior Level of Function  Transfers: Independent  Ambulation: Independent  ADL: Independent  IADL: Driving, Finances, Housekeeping, Laundry, Meal preparation, Work    Living Environment  Social support:     Type of home:     Stairs to enter the home:         Ramp:     Stairs inside the home:         Help at home:    Equipment owned:       Employment:    Currently not working, but not due to pain  Hobbies/Interests:  biking, UE weights(avoids LE due to back), eliptical    Patient goals for therapy: (Patient-Rptd) Lifting heavy things cam irritate my nack including the gym. When it is flairing up I can take a step and tanfomly get a sharp pain down my leg and minor weakness.    Pain assessment: See objective evaluation for additional pain details     Objective   LUMBAR SPINE EVALUATION  PAIN: Pain Level at Rest: 3/10  Pain Level with Use: 6/10  Pain Location: R PSIS and can go down the leg to the calf  Pain Quality: Sharp in low back and achy in leg  Pain Frequency: intermittent or but does have pain every day  Pain is Worst: daytime  Pain  is Exacerbated By: Sudden weird step, lifting heavy weight from the floor, prolonged walking, repetitive bending, sometimes sitting  Pain is Relieved By: heat, rest, stretch, ibuprofen  Pain Progression: Worsened    POSTURE: Sitting Posture: Rounded shoulders, Forward head, Lordosis decreased, Thoracic kyphosis increased  GAIT:   Weightbearing Status: WBAT  Assistive Device(s): None  Gait Deviations: WNL  BALANCE/PROPRIOCEPTION: WNL  WEIGHTBEARING ALIGNMENT: WNL  NON-WEIGHTBEARING ALIGNMENT: WNL   ROM:   (Degrees) Left AROM Left PROM  Right AROM Right PROM   Hip Flexion wnl  wnl    Hip Extension wnl  wnl    Hip Abduction       Hip Adduction       Hip Internal Rotation wnl  wnl    Hip External Rotation wnl  Wnl+    Knee Flexion       Knee Extension       Lumbar Side glide WNL+ WNL   Lumbar Flexion floor   Lumbar Extension WNL   Pain:   End feel:   PELVIC/SI SCREEN: Sacroiliac Provocation Test: +GRETCHEN HILL Gaenslens.  -supine to sit  STRENGTH:  R hip abd 4/5, er 4/5 compared to 5/5 L   Able to hold plank 20 seconds    MYOTOMES: WNL      FUNCTIONAL TESTS: Double Leg Squat: Good technique/no significant findings  PALPATION:  TTP R PSIS      Assessment & Plan   CLINICAL IMPRESSIONS  Medical Diagnosis: Chronic R LBP without sciatica    Treatment Diagnosis: R SI joint pain   Impression/Assessment: Patient is a 24 year old adult with R SI joint complaints.  The following significant findings have been identified: Pain, Decreased strength, Impaired muscle performance, Decreased activity tolerance, and Impaired posture. These impairments interfere with their ability to perform self care tasks, household chores, household mobility, and community mobility as compared to previous level of function.     Clinical Decision Making (Complexity):  Clinical Presentation: Evolving/Changing  Clinical Presentation Rationale: based on medical and personal factors listed in PT evaluation  Clinical Decision Making (Complexity): Low  complexity    PLAN OF CARE  Treatment Interventions:  Interventions: Manual Therapy, Neuromuscular Re-education, Therapeutic Activity, Therapeutic Exercise    Long Term Goals     PT Goal 1  Goal Identifier: LTG  Goal Description: Patient can consistently walk without sharp pain and overal pain is <3/10  Rationale: to maximize safety and independence with performance of ADLs and functional tasks;to maximize safety and independence within the home;to maximize safety and independence within the community  Target Date: 01/21/25      Frequency of Treatment: 2x/month  Duration of Treatment: 3 months    Recommended Referrals to Other Professionals:  none  Education Assessment:   Learner/Method: Patient;Listening;Reading;Demonstration;Pictures/Video    Risks and benefits of evaluation/treatment have been explained.   Patient/Family/caregiver agrees with Plan of Care.     Evaluation Time:     PT Eval, Low Complexity Minutes (75252): 15       Signing Clinician: Brittany Elizalde PT

## 2024-11-21 ENCOUNTER — THERAPY VISIT (OUTPATIENT)
Dept: PHYSICAL THERAPY | Facility: CLINIC | Age: 24
End: 2024-11-21
Payer: COMMERCIAL

## 2024-11-21 DIAGNOSIS — M54.50 CHRONIC RIGHT-SIDED LOW BACK PAIN WITHOUT SCIATICA: Primary | ICD-10-CM

## 2024-11-21 DIAGNOSIS — G89.29 CHRONIC RIGHT-SIDED LOW BACK PAIN WITHOUT SCIATICA: Primary | ICD-10-CM

## 2025-01-06 ENCOUNTER — DOCUMENTATION ONLY (OUTPATIENT)
Dept: LAB | Facility: CLINIC | Age: 25
End: 2025-01-06

## 2025-01-06 NOTE — PROGRESS NOTES
Patient has an upcomming lab visit without orders. Please place orders as needed.    Patient requesting: Testosterone    Appointment date: 1/7/2024

## 2025-01-07 ENCOUNTER — APPOINTMENT (OUTPATIENT)
Dept: LAB | Facility: CLINIC | Age: 25
End: 2025-01-07
Payer: COMMERCIAL

## 2025-01-30 ENCOUNTER — OFFICE VISIT (OUTPATIENT)
Dept: FAMILY MEDICINE | Facility: CLINIC | Age: 25
End: 2025-01-30
Payer: COMMERCIAL

## 2025-01-30 VITALS
DIASTOLIC BLOOD PRESSURE: 78 MMHG | TEMPERATURE: 98.2 F | BODY MASS INDEX: 31.85 KG/M2 | RESPIRATION RATE: 12 BRPM | OXYGEN SATURATION: 95 % | HEIGHT: 70 IN | HEART RATE: 78 BPM | SYSTOLIC BLOOD PRESSURE: 109 MMHG

## 2025-01-30 DIAGNOSIS — J30.89 SEASONAL ALLERGIC RHINITIS DUE TO OTHER ALLERGIC TRIGGER: ICD-10-CM

## 2025-01-30 DIAGNOSIS — F64.0 TRANSSEXUALISM: ICD-10-CM

## 2025-01-30 DIAGNOSIS — F64.9 GENDER INCONGRUENCE: Primary | ICD-10-CM

## 2025-01-30 LAB — ESTRADIOL SERPL-MCNC: 39 PG/ML

## 2025-01-30 PROCEDURE — 82670 ASSAY OF TOTAL ESTRADIOL: CPT | Mod: KX

## 2025-01-30 PROCEDURE — 84403 ASSAY OF TOTAL TESTOSTERONE: CPT | Mod: KX

## 2025-01-30 PROCEDURE — 36415 COLL VENOUS BLD VENIPUNCTURE: CPT

## 2025-01-30 PROCEDURE — 99214 OFFICE O/P EST MOD 30 MIN: CPT | Mod: GC

## 2025-01-30 RX ORDER — CETIRIZINE HYDROCHLORIDE 10 MG/1
10 TABLET ORAL DAILY
Qty: 30 TABLET | Refills: 1 | Status: SHIPPED | OUTPATIENT
Start: 2025-01-30

## 2025-01-30 NOTE — PATIENT INSTRUCTIONS
Effects and Expected Time Course of Feminizing Hormones    Effect Expected Onset Expected Maximum Effect   Body Fat redistribution 3-6 months 2-5 years   Decreased Muscle mass 3-6 months 1-2 years   Softening of skin/decreased oiliness 3-6 months Unknown   Decreased Libido 1-3 months 1-2 years   Decreased Spontaneous Erections 1-3 months 3-6 months   Male Sexual Dysfunction Variable Variable   Breast Growth 3-6 months 2-3 years   Decreased Testicular volume 3-6 months 2-3 years   Decreased sperm production Variable Variable   Thinning and slowed growth of body and facial hair  + 6-12 months >3 years   Male pattern baldness No regrowth, loss stops 1-3 months 1-2 years   +complete removal of male facial hair and body hair requires electrolysis, laser treatment or both      Effects and Expected Time Course of Masculinizing  Hormones    Effect Expected Onset Expected Maximum Effect   Skin oiliness/Acne 1-6 months 1-2 years   Facial/body hair growth 3-6 months 3-5 years    Scalp hair loss >12 months+ Variable   Increased muscle mass/strength 6-12 months 2-5 years   Body fat redistribution 3-6 months 2-5 years   Cessation of menses 2-6 months n/a   Clitoral enlargement 3-6 months 1-2 years   Vaginal atrophy 3-6 months 1-2 years   Deepened voice 3-12 months 1-2 years       Patient Education   Here is the plan from today's visit    1. Gender incongruence (Primary)  - Testosterone total; Future  - Estradiol; Future    2. Seasonal allergic rhinitis due to other allergic trigger  - cetirizine (ZYRTEC) 10 MG tablet; Take 1 tablet (10 mg) by mouth daily.  Dispense: 30 tablet; Refill: 1      Please call or return to clinic if your symptoms don't go away.    Follow up plan  Return in about 3 months (around 4/30/2025) for Follow up gender care.    Thank you for coming to [facility] today.  Lab Testing:  **If you had lab testing today and your results are reassuring or normal they will be mailed to you or sent through [employer]  within 7 days.   **If the lab tests need quick action we will call you with the results.  **If you are having labs done on a different day, please call [phone] to schedule at [facility] or [phone] for other [facility] locations. Labs do not offer walk-in appointments.  The phone number we will call with results is # [phone] (home) . If this is not the best number please call our clinic and change the number.  Medication Refills:  If you need any refills please call your pharmacy and they will contact us.   If you need to  your refill at a new pharmacy, please contact the new pharmacy directly. The new pharmacy will help you get your medications transferred faster.   Scheduling:  If you have any concerns about today's visit or wish to schedule another appointment please call our office during normal business hours [phone] (8-5:00 M-F). If you can no longer make a scheduled visit, please cancel via [employer] or call us to cancel.   If a referral was made to an [facility] specialty provider and you do not get a call from central scheduling, please refer to directions on your visit summary or call our office during normal business hours for assistance.   If a Mammogram was ordered for you at the [facility] call [phone] to schedule or change your appointment.  If you had an XRay/CT/Ultrasound/MRI ordered the number is [phone] to schedule or change your radiology appointment.   [facility] has limited ultrasound appointments available on Wednesdays, if you would like your ultrasound at [facility], please call [phone] to schedule.   Medical Concerns:  If you have urgent medical concerns please call [phone] at any time of the day.    Melissa Ribeiro MD

## 2025-01-30 NOTE — PROGRESS NOTES
"  {PROVIDER CHARTING PREFERENCE:836650}    Etta Oliver is a 24 year old, presenting for the following health issues:  RECHECK (Increase dose for testosterone)      1/30/2025    11:01 AM   Additional Questions   Roomed by Ohn   Accompanied by Self         1/30/2025   Declines Weight   Did patient decline having their weight taken? Yes         1/30/2025    Information    services provided? No     HPI     {MA/LPN/RN Pre-Provider Visit Orders- hCG/UA/Strep (Optional):293393}  {SUPERLIST (Optional):266633}  {additonal problems for provider to add (Optional):044102}    {ROS Picklists (Optional):482199}      Objective    /78   Pulse 78   Temp 98.2  F (36.8  C) (Temporal)   Resp 12   Ht 1.778 m (5' 10\")   SpO2 95%   BMI 31.85 kg/m    Body mass index is 31.85 kg/m .  Physical Exam   {Exam List (Optional):821832}    {Diagnostic Test Results (Optional):887630}        Signed Electronically by: Melissa Ribeiro MD  {Email feedback regarding this note to [email or web address]   :681629}  "

## 2025-01-30 NOTE — PROGRESS NOTES
Assessment & Plan     Gender incongruence  Doing well on stable dose of testosterone since October with desired changes. Has been on hormone since September 2023.  - Testosterone total  - Estradiol  - anticipate dose change with titration of testosterone to 70 mg dose (pending lab results)  - next follow up 3 months  - standard monitoring recommended after dose interval at 3 mo, 6 mo then annual     Seasonal allergic rhinitis due to other allergic trigger  Itching ears  History of allergies now with itching ears, eyes and exposure to pet dander. Normal TMs on exam. Recommended trial of zyrtec and allergen avoidance when possible.  - cetirizine (ZYRTEC) 10 MG tablet  Dispense: 30 tablet; Refill: 1      Return in about 3 months (around 4/30/2025) for Follow up gender care.    Etta Oliver is a 24 year old, presenting for the following health issues:  RECHECK (Increase dose for testosterone)        1/30/2025    11:01 AM   Additional Questions   Roomed by Ohn   Accompanied by Self         1/30/2025   Declines Weight   Did patient decline having their weight taken? Yes         1/30/2025    Information    services provided? No     HPI     Benito is a 24 year old individual that uses pronouns They/Them/Their/Theirs that presents today for follow up of gender affirming hormone therapy for gender affirmation.    Voice deepening, facial hair, gaining muscle mass - hoping to get more of all of this  Having some puberty emotional stuff  Pace of changes feels generally good    GAHT history   Started GAHT: Sept 2023  Currently on: 50mg testosterone cypionate, Shot day: Sunday  Last visit: Oct 2024  Last labs: Oct 2024    S/p gonadectomy? No      Interval history  Missed doses?  Yes - missed 2 weeks over [place]  Overall things are going: well  What changes have you noticed?   Fat redistribution: Yes   Changes in hair:  Yes, facial hair and some body hair  Bottom growth: Yes  Voice change:  "Yes  Vaginal bleeding: Hadn't for multiple months, then a few weeks ago had some spotting. Had missed a dose or two over Christmas break.     Pace of changes:  acceptable       Side effects?   Acne or oily skin: YES- especially on back  Shortness of breath: No  Hot flashes:  No  Mood swings: YES- manageable, mild  Migraine: No  Genital pain: No  Bothersome changes in sexual experience or libido: No    Had pap with [facility] about two years ago  6/15/2022 normal          Objective    /78   Pulse 78   Temp 98.2  F (36.8  C) (Temporal)   Resp 12   Ht 1.778 m (5' 10\")   SpO2 95%   BMI 31.85 kg/m    Body mass index is 31.85 kg/m .  Physical Exam   General: No acute distress; cat hair on facial mask  Head: No obvious trauma to head.  Ears, Nose, Throat:  External ears normal. TMs wnll. Nose normal.  No pharyngeal erythema, swelling or exudate.  Midline uvula.    Eyes:  Conjunctivae clear.  Pupils are equal, round, and reactive.   Neck: Normal range of motion.  Neck supple.   CV: Regular rate and rhythm.  No murmurs.      Respiratory: Effort normal and breath sounds normal.  No wheezing or crackles.   Neuro: Alert. Moving all extremities appropriately.  Normal speech.    Skin: Skin is warm and dry.  No rash noted.   Psych: Normal mood and affect. Behavior is normal.              Signed Electronically by: Melissa Ribeiro MD    "

## 2025-02-01 LAB — TESTOST SERPL-MCNC: 521 NG/DL (ref 8–950)

## 2025-02-02 RX ORDER — TESTOSTERONE CYPIONATE 200 MG/ML
70 INJECTION, SOLUTION INTRAMUSCULAR WEEKLY
Qty: 4 ML | Refills: 3 | Status: SHIPPED | OUTPATIENT
Start: 2025-02-02

## 2025-02-03 DIAGNOSIS — F64.0 TRANSSEXUALISM: ICD-10-CM

## 2025-02-04 RX ORDER — SYRINGE, DISPOSABLE, 1 ML
SYRINGE, EMPTY DISPOSABLE MISCELLANEOUS
Qty: 25 EACH | Refills: 3 | Status: SHIPPED | OUTPATIENT
Start: 2025-02-04

## 2025-02-04 RX ORDER — NEEDLES, DISPOSABLE 25GX5/8"
NEEDLE, DISPOSABLE MISCELLANEOUS
Qty: 25 EACH | Refills: 3 | Status: SHIPPED | OUTPATIENT
Start: 2025-02-04

## (undated) DEVICE — SOL NACL 0.9% IRRIG 500ML BOTTLE 2F7123

## (undated) DEVICE — GLOVE PROTEXIS BLUE W/NEU-THERA 8.0  2D73EB80

## (undated) DEVICE — ESU PENCIL SMOKE EVAC W/ROCKER SWITCH 0703-047-000

## (undated) DEVICE — GOWN LG DISP 9515

## (undated) DEVICE — SUCTION TIP YANKAUER W/O VENT K86

## (undated) DEVICE — SU SILK 2-0 SH 30" K833H

## (undated) DEVICE — DRAPE LAP TRANSVERSE 29421

## (undated) DEVICE — SOL RINGERS LACTATED 1000ML BAG 2B2324X

## (undated) DEVICE — ESU CLEANER TIP 31142717

## (undated) DEVICE — SU MONOCRYL 4-0 PS-2 27" UND Y426H

## (undated) DEVICE — PREP CHLORAPREP 26ML TINTED ORANGE  260815

## (undated) DEVICE — DRAIN JACKSON PRATT CHANNEL 15FR ROUND HUBLESS SIL JP-2228

## (undated) DEVICE — SPONGE LAP 18X18" X8435

## (undated) DEVICE — GLOVE GAMMEX NEOPRENE ULTRA SZ 7.5 LF 8515

## (undated) DEVICE — NDL 18GA 1.5" 305196

## (undated) DEVICE — DRSG KERLIX 4 1/2"X4YDS ROLL 6730

## (undated) DEVICE — GLOVE PROTEXIS MICRO 6.5  2D73PM65

## (undated) DEVICE — SU VICRYL 2-0 SH 27" UND J417H

## (undated) DEVICE — PACK MINOR CUSTOM ASC

## (undated) DEVICE — STPL SKIN SUBCUTICULAR INSORB  2030

## (undated) DEVICE — DRAPE SHEET REV FOLD 3/4 9349

## (undated) DEVICE — LINEN TOWEL PACK X5 5464

## (undated) DEVICE — SU PLAIN FAST ABSORB 5-0 PC-1 18" 1915G

## (undated) DEVICE — STPL SKIN 35W ROTATING HEAD PRW35

## (undated) DEVICE — PAD CHUX UNDERPAD 30X30"

## (undated) DEVICE — ESU GROUND PAD ADULT W/CORD E7507

## (undated) DEVICE — DRSG ABDOMINAL 07 1/2X8" 7197D

## (undated) DEVICE — DRAPE IOBAN INCISE 23X17" 6650EZ

## (undated) DEVICE — BLADE KNIFE SURG 15 371115

## (undated) DEVICE — BNDG ELASTIC 6"X5YDS UNSTERILE 6611-60

## (undated) DEVICE — BNDG ELASTIC 6" DBL LENGTH UNSTERILE 6611-16

## (undated) DEVICE — SU WND CLOSURE VLOC 90 ABS 4-0 18" P-12 VLOCM0023

## (undated) DEVICE — TUBING IRRIGATION LIPOSUCTION 13' MRI COMP CG-INF-T

## (undated) DEVICE — DRAIN JACKSON PRATT RESERVOIR 100ML SU130-1305

## (undated) DEVICE — GLOVE PROTEXIS BLUE W/NEU-THERA 7.0  2D73EB70

## (undated) DEVICE — STRAP KNEE/BODY 31143004

## (undated) DEVICE — PEN MARKING SKIN W/PAPER RULER 31145785

## (undated) DEVICE — SUCTION MANIFOLD NEPTUNE 2 SYS 1 PORT 702-025-000

## (undated) DEVICE — ESU ELEC BLADE 2.75" COATED/INSULATED E1455

## (undated) RX ORDER — FENTANYL CITRATE 50 UG/ML
INJECTION, SOLUTION INTRAMUSCULAR; INTRAVENOUS
Status: DISPENSED
Start: 2022-10-05

## (undated) RX ORDER — HYDROMORPHONE HYDROCHLORIDE 1 MG/ML
INJECTION, SOLUTION INTRAMUSCULAR; INTRAVENOUS; SUBCUTANEOUS
Status: DISPENSED
Start: 2022-10-05

## (undated) RX ORDER — OXYCODONE HYDROCHLORIDE 5 MG/1
TABLET ORAL
Status: DISPENSED
Start: 2022-10-05

## (undated) RX ORDER — GLYCOPYRROLATE 0.2 MG/ML
INJECTION INTRAMUSCULAR; INTRAVENOUS
Status: DISPENSED
Start: 2022-10-05

## (undated) RX ORDER — CEFAZOLIN SODIUM 1 G/3ML
INJECTION, POWDER, FOR SOLUTION INTRAMUSCULAR; INTRAVENOUS
Status: DISPENSED
Start: 2022-10-05

## (undated) RX ORDER — PROPOFOL 10 MG/ML
INJECTION, EMULSION INTRAVENOUS
Status: DISPENSED
Start: 2022-10-05

## (undated) RX ORDER — CEFAZOLIN SODIUM 2 G/50ML
SOLUTION INTRAVENOUS
Status: DISPENSED
Start: 2022-10-05

## (undated) RX ORDER — TRANEXAMIC ACID 100 MG/ML
INJECTION, SOLUTION INTRAVENOUS
Status: DISPENSED
Start: 2022-10-05

## (undated) RX ORDER — LIDOCAINE HYDROCHLORIDE 20 MG/ML
INJECTION, SOLUTION EPIDURAL; INFILTRATION; INTRACAUDAL; PERINEURAL
Status: DISPENSED
Start: 2022-10-05

## (undated) RX ORDER — DEXAMETHASONE SODIUM PHOSPHATE 4 MG/ML
INJECTION, SOLUTION INTRA-ARTICULAR; INTRALESIONAL; INTRAMUSCULAR; INTRAVENOUS; SOFT TISSUE
Status: DISPENSED
Start: 2022-10-05

## (undated) RX ORDER — ACETAMINOPHEN 325 MG/1
TABLET ORAL
Status: DISPENSED
Start: 2022-10-05

## (undated) RX ORDER — ONDANSETRON 2 MG/ML
INJECTION INTRAMUSCULAR; INTRAVENOUS
Status: DISPENSED
Start: 2022-10-05